# Patient Record
Sex: MALE | Race: OTHER | Employment: FULL TIME | URBAN - METROPOLITAN AREA
[De-identification: names, ages, dates, MRNs, and addresses within clinical notes are randomized per-mention and may not be internally consistent; named-entity substitution may affect disease eponyms.]

---

## 2018-07-27 ENCOUNTER — OFFICE VISIT (OUTPATIENT)
Dept: FAMILY MEDICINE CLINIC | Age: 23
End: 2018-07-27

## 2018-07-27 VITALS
SYSTOLIC BLOOD PRESSURE: 118 MMHG | DIASTOLIC BLOOD PRESSURE: 76 MMHG | RESPIRATION RATE: 14 BRPM | OXYGEN SATURATION: 98 % | WEIGHT: 165.2 LBS | BODY MASS INDEX: 25.04 KG/M2 | HEART RATE: 54 BPM | HEIGHT: 68 IN | TEMPERATURE: 97 F

## 2018-07-27 DIAGNOSIS — L23.7 POISON IVY DERMATITIS: Primary | ICD-10-CM

## 2018-07-27 PROCEDURE — 99203 OFFICE O/P NEW LOW 30 MIN: CPT | Performed by: NURSE PRACTITIONER

## 2018-07-27 RX ORDER — TRIAMCINOLONE ACETONIDE 40 MG/ML
80 INJECTION, SUSPENSION INTRA-ARTICULAR; INTRAMUSCULAR ONCE
Status: COMPLETED | OUTPATIENT
Start: 2018-07-27 | End: 2018-07-27

## 2018-07-27 RX ORDER — DIAPER,BRIEF,INFANT-TODD,DISP
EACH MISCELLANEOUS
Qty: 56 G | Refills: 1 | Status: SHIPPED | OUTPATIENT
Start: 2018-07-27 | End: 2022-01-11

## 2018-07-27 RX ADMIN — TRIAMCINOLONE ACETONIDE 80 MG: 40 INJECTION, SUSPENSION INTRA-ARTICULAR; INTRAMUSCULAR at 13:17

## 2018-07-27 ASSESSMENT — ENCOUNTER SYMPTOMS
RHINORRHEA: 0
ABDOMINAL PAIN: 0
NAIL CHANGES: 0
EYE DISCHARGE: 0
EYE ITCHING: 0
SHORTNESS OF BREATH: 0
VOMITING: 0
NAUSEA: 0
DIARRHEA: 0
SORE THROAT: 0
PHOTOPHOBIA: 0
COUGH: 0
SINUS PRESSURE: 0
EYE PAIN: 0
EYE REDNESS: 0
TROUBLE SWALLOWING: 0

## 2018-07-27 ASSESSMENT — PATIENT HEALTH QUESTIONNAIRE - PHQ9
SUM OF ALL RESPONSES TO PHQ QUESTIONS 1-9: 0
1. LITTLE INTEREST OR PLEASURE IN DOING THINGS: 0
SUM OF ALL RESPONSES TO PHQ9 QUESTIONS 1 & 2: 0
2. FEELING DOWN, DEPRESSED OR HOPELESS: 0

## 2018-07-27 NOTE — PATIENT INSTRUCTIONS
Patient Education        Poison Baldev Tod, [place], and Sumac: Care Instructions  Your Care Instructions    Poison ivy, poison oak, and poison sumac are plants that can cause a skin rash upon contact. The red, itchy rash often shows up in lines or streaks and may cause fluid-filled blisters or large, raised hives. The rash is caused by an allergic reaction to an oil in poison ivy, oak, and sumac. The rash may occur when you touch the plant or when you touch clothing, pet fur, sporting gear, gardening tools, or other objects that have come in contact with one of these plants. You cannot catch or spread the rash, even if you touch it or the blister fluid, because the plant oil will already have been absorbed or washed off the skin. The rash may seem to be spreading, but either it is still developing from earlier contact or you have touched something that still has the plant oil on it. Follow-up care is a key part of your treatment and safety. Be sure to make and go to all appointments, and call your doctor if you are having problems. It's also a good idea to know your test results and keep a list of the medicines you take. How can you care for yourself at home? · If your doctor prescribed a cream, use it as directed. If your doctor prescribed medicine, take it exactly as prescribed. Call your doctor if you think you are having a problem with your medicine. · Use cold, wet cloths to reduce itching. · Keep cool, and stay out of the sun. · Leave the rash open to the air. · Wash all clothing or other things that may have come in contact with the plant oil. · Avoid most lotions and ointments until the rash heals. Calamine lotion may help relieve symptoms of a plant rash. Use it 3 or 4 times a day. To prevent poison ivy exposure  If you know that you will be near poison ivy, oak, or sumac, you can try these options:  · Use a product designed to help prevent plant oil from getting on the skin.  These products, such as Baldev Tod

## 2021-06-03 ENCOUNTER — HOSPITAL ENCOUNTER (EMERGENCY)
Age: 26
Discharge: HOME | End: 2021-06-03
Payer: COMMERCIAL

## 2021-06-03 VITALS
HEART RATE: 55 BPM | TEMPERATURE: 98.9 F | HEIGHT: 68 IN | RESPIRATION RATE: 16 BRPM | WEIGHT: 170 LBS | BODY MASS INDEX: 25.76 KG/M2 | OXYGEN SATURATION: 96 %

## 2021-06-03 DIAGNOSIS — K08.89 PAIN, DENTAL: Primary | ICD-10-CM

## 2021-06-03 DIAGNOSIS — K04.7 DENTAL INFECTION: ICD-10-CM

## 2021-06-03 PROCEDURE — 99284 EMERGENCY DEPT VISIT MOD MDM: CPT

## 2021-06-03 PROCEDURE — 6370000000 HC RX 637 (ALT 250 FOR IP): Performed by: NURSE PRACTITIONER

## 2021-06-03 RX ORDER — TRAMADOL HYDROCHLORIDE 50 MG/1
50 TABLET ORAL ONCE
Status: COMPLETED | OUTPATIENT
Start: 2021-06-03 | End: 2021-06-03

## 2021-06-03 RX ORDER — PENICILLIN V POTASSIUM 500 MG/1
500 TABLET ORAL 4 TIMES DAILY
Qty: 28 TABLET | Refills: 0 | Status: SHIPPED | OUTPATIENT
Start: 2021-06-03 | End: 2021-06-10

## 2021-06-03 RX ORDER — IBUPROFEN 600 MG/1
600 TABLET ORAL EVERY 6 HOURS PRN
Qty: 28 TABLET | Refills: 0 | Status: SHIPPED | OUTPATIENT
Start: 2021-06-03 | End: 2022-01-11

## 2021-06-03 RX ORDER — TRAMADOL HYDROCHLORIDE 50 MG/1
50 TABLET ORAL EVERY 6 HOURS PRN
Qty: 12 TABLET | Refills: 0 | Status: SHIPPED | OUTPATIENT
Start: 2021-06-03 | End: 2021-06-06

## 2021-06-03 RX ORDER — PENICILLIN V POTASSIUM 500 MG/1
500 TABLET ORAL ONCE
Status: COMPLETED | OUTPATIENT
Start: 2021-06-03 | End: 2021-06-03

## 2021-06-03 RX ADMIN — PENICILLIN V POTASIUM 500 MG: 500 TABLET OROPHARYNGEAL at 11:50

## 2021-06-03 RX ADMIN — TRAMADOL HYDROCHLORIDE 50 MG: 50 TABLET ORAL at 11:50

## 2021-06-03 ASSESSMENT — ENCOUNTER SYMPTOMS
COUGH: 0
WHEEZING: 0
CONSTIPATION: 0
VOMITING: 0
COLOR CHANGE: 0
BACK PAIN: 0
SINUS PRESSURE: 0
NAUSEA: 0
ABDOMINAL DISTENTION: 0
RHINORRHEA: 0
CHEST TIGHTNESS: 0
SORE THROAT: 0
TROUBLE SWALLOWING: 0
ABDOMINAL PAIN: 0
FACIAL SWELLING: 1
SINUS PAIN: 0
SHORTNESS OF BREATH: 0
DIARRHEA: 0

## 2021-06-03 ASSESSMENT — PAIN DESCRIPTION - LOCATION: LOCATION: TEETH

## 2021-06-03 ASSESSMENT — PAIN DESCRIPTION - PAIN TYPE: TYPE: ACUTE PAIN

## 2021-06-03 ASSESSMENT — PAIN SCALES - GENERAL
PAINLEVEL_OUTOF10: 8
PAINLEVEL_OUTOF10: 9

## 2021-06-03 ASSESSMENT — PAIN DESCRIPTION - ORIENTATION: ORIENTATION: LEFT;LOWER

## 2021-06-03 ASSESSMENT — PAIN DESCRIPTION - DESCRIPTORS: DESCRIPTORS: ACHING

## 2021-06-03 ASSESSMENT — PAIN DESCRIPTION - FREQUENCY: FREQUENCY: CONTINUOUS

## 2021-06-03 NOTE — ED PROVIDER NOTES
[address] [facility] ED  EMERGENCY DEPARTMENT ENCOUNTER      Pt Name: Adrian Almodovar  MRN: [id number]  [place] 1995  Date of evaluation: 6/3/2021  Provider: DONAVAN Barnes CNP    CHIEF COMPLAINT       Chief Complaint   Patient presents with    Dental Pain     pt c/o left lower dental pain for the past week         HISTORY OF PRESENT ILLNESS   (Location/Symptom, Timing/Onset,Context/Setting, Quality, Duration, Modifying Factors, Severity)  Note limiting factors. Adrian Almodovar is a 26 y.o. male who presents to the emergency department for complaint of left lower jaw dental pain. Patient states that he has a tooth that had a small chip in it for months but over the past few days has been significantly more painful and swollen. Denies any new injury to the face or tooth. Pain is now an 8 out of 10 aching sharp throbbing pain made worse when he tries to chew. He states that the pain woke him last night and kept him awake for most of the night he has been taking Motrin 800 for the past day but it is becoming less effective. Denies any swelling in his throat difficulty swallowing. Denies any fever chills sweats or other illnesses or injuries. Nursing Notes were reviewed. REVIEW OF SYSTEMS    (2-9 systems for level 4, 10 or more for level 5)     Review of Systems   Constitutional: Negative for activity change, appetite change, chills, diaphoresis, fatigue and fever. HENT: Positive for dental problem and facial swelling. Negative for congestion, postnasal drip, rhinorrhea, sinus pressure, sinus pain, sore throat and trouble swallowing. Eyes: Negative for visual disturbance. Respiratory: Negative for cough, chest tightness, shortness of breath and wheezing. Cardiovascular: Negative for chest pain and palpitations. Gastrointestinal: Negative for abdominal distention, abdominal pain, constipation, diarrhea, nausea and vomiting.    Genitourinary: Negative for difficulty urinating, dysuria, flank pain, frequency and urgency. Musculoskeletal: Negative for arthralgias, back pain, myalgias, neck pain and neck stiffness. Skin: Negative for color change and rash. Neurological: Negative for dizziness, tremors, seizures, syncope, speech difficulty, weakness, light-headedness, numbness and headaches. Except as noted above the remainder of the review of systems was reviewed and negative. PAST MEDICAL HISTORY   History reviewed. No pertinent past medical history. History reviewed. No pertinent surgical history. Social History     Socioeconomic History    Marital status: Single     Spouse name: None    Number of children: None    Years of education: None    Highest education level: None   Occupational History    None   Tobacco Use    Smoking status: Never Smoker    Smokeless tobacco: Never Used   Substance and Sexual Activity    Alcohol use: Yes    Drug use: Yes     Types: Marijuana    Sexual activity: None   Other Topics Concern    None   Social History Narrative    None     Social Determinants of Health     Financial Resource Strain:     Difficulty of Paying Living Expenses:    Food Insecurity:     Worried About Running Out of Food in the Last Year:     [address] in the Last Year:    Transportation Needs:     Lack of Transportation (Medical):      Lack of Transportation (Non-Medical):    Physical Activity:     Days of Exercise per Week:     Minutes of Exercise per Session:    Stress:     Feeling of Stress :    Social Connections:     Frequency of Communication with Friends and Family:     Frequency of Social Gatherings with Friends and Family:     Attends Jewish Services:     Active Member of Clubs or Organizations:     Attends Club or Organization Meetings:     Marital Status:    Intimate Partner Violence:     Fear of Current or Ex-Partner:     Emotionally Abused:     Physically Abused:     Sexually Abused:        SCREENINGS PHYSICAL EXAM    (up to 7 for level 4, 8 or more for level 5)     ED Triage Vitals [06/03/21 1124]   BP Temp Temp Source Pulse Resp SpO2 Height Weight   -- 98.9 °F (37.2 °C) Oral 55 16 96 % 5' 8\" (1.727 m) 170 lb (77.1 kg)       Physical Exam  Constitutional:       General: He is not in acute distress. Appearance: Normal appearance. He is normal weight. He is not ill-appearing, toxic-appearing or diaphoretic. HENT:      Head: Normocephalic and atraumatic. Right Ear: External ear normal.      Left Ear: External ear normal.      Nose: Nose normal. No congestion or rhinorrhea. Mouth/Throat:      Mouth: Mucous membranes are moist.      Dentition: Abnormal dentition. Dental tenderness and gingival swelling present. No dental caries or gum lesions. Pharynx: Oropharynx is clear. Uvula midline. No pharyngeal swelling, oropharyngeal exudate, posterior oropharyngeal erythema or uvula swelling. Eyes:      General:         Right eye: No discharge. Left eye: No discharge. Conjunctiva/sclera: Conjunctivae normal.      Pupils: Pupils are equal, round, and reactive to light. Cardiovascular:      Rate and Rhythm: Normal rate and regular rhythm. Pulses: Normal pulses. Pulmonary:      Effort: Pulmonary effort is normal.      Breath sounds: Normal breath sounds. Musculoskeletal:         General: No tenderness or signs of injury. Normal range of motion. Cervical back: Normal range of motion and neck supple. No rigidity or tenderness. Skin:     General: Skin is warm and dry. Capillary Refill: Capillary refill takes less than 2 seconds. Neurological:      General: No focal deficit present. Mental Status: He is alert and oriented to person, place, and time. Mental status is at baseline. Cranial Nerves: No cranial nerve deficit. Sensory: No sensory deficit. Motor: No weakness.       Coordination: Coordination normal.         RESULTS     EKG: All EKG's are interpreted by the Emergency Department Physician who either signs or Co-signsthis chart in the absence of a cardiologist.        RADIOLOGY:   [place] such as CT, Ultrasound and MRI are read by the radiologist. Plain radiographic images are visualized and preliminarily interpreted by the emergency physician with the below findings:        Interpretation per the Radiologist below, if available at the time ofthis note:    No orders to display         ED BEDSIDE ULTRASOUND:   Performed by ED Physician - none    LABS:  Labs Reviewed - No data to display    All other labs were within normal range or not returned as of this dictation. EMERGENCY DEPARTMENT COURSE and DIFFERENTIAL DIAGNOSIS/MDM:   Vitals:    Vitals:    06/03/21 1124   Pulse: 55   Resp: 16   Temp: 98.9 °F (37.2 °C)   TempSrc: Oral   SpO2: 96%   Weight: 170 lb (77.1 kg)   Height: 5' 8\" (1.727 m)            MDM patient is afebrile nontoxic no acute distress hemodynamic stable. Patient has noticeable swelling left lower jaw and on visual examination there is a tooth that has a fractured tooth. There is no or obvious large dental decay in this area but there is mild swelling and erythema to the gumline. There does not appear to be any outer visible facial trauma. Due to concern for a dental infection patient given first dose of penicillin as well as tramadol for pain discomfort he did take a large dose of ibuprofen this morning. Patient be discharged home with addition medication including Motrin tramadol and penicillin with direction to follow-up with primary care provider and dentist soon as possible. He states that he is just now getting established with his insurance and is trying to follow-up with a dentist will make his appointment as he can. Return to the ER if any onset of new concerns and worsening condition. Patient verbalized understandable given instruction education.     CRITICAL CARE TIME CONSULTS:  None    PROCEDURES:  Unless otherwise noted below, none     Procedures    FINAL IMPRESSION      1. Pain, dental    2. Dental infection          DISPOSITION/PLAN   DISPOSITION Discharge - Pending Orders Complete 06/03/2021 11:51:34 AM      PATIENT REFERRED TO:  Madhu Sousa MD  [address]  [address]  [phone]    Call in 1 day      [facility]  [phone]  Call in 1 day        DISCHARGE MEDICATIONS:  New Prescriptions    IBUPROFEN (ADVIL;MOTRIN) 600 MG TABLET    Take 1 tablet by mouth every 6 hours as needed for Pain    PENICILLIN V POTASSIUM (VEETID) 500 MG TABLET    Take 1 tablet by mouth 4 times daily for 7 days    TRAMADOL (ULTRAM) 50 MG TABLET    Take 1 tablet by mouth every 6 hours as needed for Pain for up to 3 days. Intended supply: 3 days.  Take lowest dose possible to manage pain          (Please notethat portions of this note were completed with a voice recognition program.  Efforts were made to edit the dictations but occasionally words are mis-transcribed.)    DONAVAN Chappell CNP (electronically signed)  Attending Emergency Physician         DONAVAN Chappell CNP  06/03/21 [id number]

## 2021-06-03 NOTE — ED TRIAGE NOTES
Pt c/o lower left side dental pain for the past week, Pt is A&OX3, calm, ambulatory, afebrile, breathes are equal and unlabored,

## 2022-01-11 ENCOUNTER — OFFICE VISIT (OUTPATIENT)
Dept: FAMILY MEDICINE CLINIC | Age: 27
End: 2022-01-11

## 2022-01-11 ENCOUNTER — HOSPITAL ENCOUNTER (INPATIENT)
Age: 27
LOS: 2 days | Discharge: HOME | DRG: 153 | End: 2022-01-13
Attending: EMERGENCY MEDICINE | Admitting: INTERNAL MEDICINE
Payer: COMMERCIAL

## 2022-01-11 ENCOUNTER — APPOINTMENT (OUTPATIENT)
Dept: CT IMAGING | Age: 27
DRG: 153 | End: 2022-01-11
Payer: COMMERCIAL

## 2022-01-11 VITALS
HEART RATE: 101 BPM | TEMPERATURE: 96.8 F | BODY MASS INDEX: 26.52 KG/M2 | OXYGEN SATURATION: 99 % | WEIGHT: 175 LBS | HEIGHT: 68 IN | DIASTOLIC BLOOD PRESSURE: 70 MMHG | SYSTOLIC BLOOD PRESSURE: 122 MMHG

## 2022-01-11 DIAGNOSIS — L02.91 PHLEGMON: Primary | ICD-10-CM

## 2022-01-11 DIAGNOSIS — J03.90 ACUTE TONSILLITIS, UNSPECIFIED ETIOLOGY: ICD-10-CM

## 2022-01-11 DIAGNOSIS — J36 TONSILLAR ABSCESS: Primary | ICD-10-CM

## 2022-01-11 LAB
ALBUMIN SERPL-MCNC: 4.1 G/DL (ref 3.5–4.6)
ALP BLD-CCNC: 89 U/L (ref 35–104)
ALT SERPL-CCNC: 7 U/L (ref 0–41)
ANION GAP SERPL CALCULATED.3IONS-SCNC: 13 MEQ/L (ref 9–15)
AST SERPL-CCNC: 11 U/L (ref 0–40)
BASOPHILS ABSOLUTE: 0 K/UL (ref 0–0.2)
BASOPHILS RELATIVE PERCENT: 0.2 %
BILIRUB SERPL-MCNC: 0.5 MG/DL (ref 0.2–0.7)
BUN BLDV-MCNC: 9 MG/DL (ref 6–20)
CALCIUM SERPL-MCNC: 9.2 MG/DL (ref 8.5–9.9)
CHLORIDE BLD-SCNC: 102 MEQ/L (ref 95–107)
CO2: 24 MEQ/L (ref 20–31)
CREAT SERPL-MCNC: 0.88 MG/DL (ref 0.7–1.2)
EOSINOPHILS ABSOLUTE: 0 K/UL (ref 0–0.7)
EOSINOPHILS RELATIVE PERCENT: 0.2 %
GFR AFRICAN AMERICAN: >60
GFR NON-AFRICAN AMERICAN: >60
GLOBULIN: 3.5 G/DL (ref 2.3–3.5)
GLUCOSE BLD-MCNC: 109 MG/DL (ref 70–99)
HCT VFR BLD CALC: 42.8 % (ref 42–52)
HEMOGLOBIN: 14.2 G/DL (ref 14–18)
LACTIC ACID: 1.6 MMOL/L (ref 0.5–2.2)
LYMPHOCYTES ABSOLUTE: 1.1 K/UL (ref 1–4.8)
LYMPHOCYTES RELATIVE PERCENT: 9.6 %
MCH RBC QN AUTO: 29.3 PG (ref 27–31.3)
MCHC RBC AUTO-ENTMCNC: 33.1 % (ref 33–37)
MCV RBC AUTO: 88.4 FL (ref 80–100)
MONO TEST: NEGATIVE
MONOCYTES ABSOLUTE: 1 K/UL (ref 0.2–0.8)
MONOCYTES RELATIVE PERCENT: 8.6 %
NEUTROPHILS ABSOLUTE: 9.4 K/UL (ref 1.4–6.5)
NEUTROPHILS RELATIVE PERCENT: 81.4 %
PDW BLD-RTO: 13.3 % (ref 11.5–14.5)
PLATELET # BLD: 286 K/UL (ref 130–400)
POC CREATININE WHOLE BLOOD: 0.9
POTASSIUM SERPL-SCNC: 4.1 MEQ/L (ref 3.4–4.9)
RBC # BLD: 4.84 M/UL (ref 4.7–6.1)
SODIUM BLD-SCNC: 139 MEQ/L (ref 135–144)
STREP GRP A PCR: POSITIVE
TOTAL PROTEIN: 7.6 G/DL (ref 6.3–8)
WBC # BLD: 11.6 K/UL (ref 4.8–10.8)

## 2022-01-11 PROCEDURE — 96374 THER/PROPH/DIAG INJ IV PUSH: CPT

## 2022-01-11 PROCEDURE — 6360000002 HC RX W HCPCS

## 2022-01-11 PROCEDURE — 86308 HETEROPHILE ANTIBODY SCREEN: CPT

## 2022-01-11 PROCEDURE — 83605 ASSAY OF LACTIC ACID: CPT

## 2022-01-11 PROCEDURE — 6360000004 HC RX CONTRAST MEDICATION: Performed by: PHYSICIAN ASSISTANT

## 2022-01-11 PROCEDURE — 80053 COMPREHEN METABOLIC PANEL: CPT

## 2022-01-11 PROCEDURE — 2580000003 HC RX 258: Performed by: INTERNAL MEDICINE

## 2022-01-11 PROCEDURE — 2580000003 HC RX 258: Performed by: PHYSICIAN ASSISTANT

## 2022-01-11 PROCEDURE — 85025 COMPLETE CBC W/AUTO DIFF WBC: CPT

## 2022-01-11 PROCEDURE — 36415 COLL VENOUS BLD VENIPUNCTURE: CPT

## 2022-01-11 PROCEDURE — 96375 TX/PRO/DX INJ NEW DRUG ADDON: CPT

## 2022-01-11 PROCEDURE — 6360000002 HC RX W HCPCS: Performed by: PHYSICIAN ASSISTANT

## 2022-01-11 PROCEDURE — 87040 BLOOD CULTURE FOR BACTERIA: CPT

## 2022-01-11 PROCEDURE — 99285 EMERGENCY DEPT VISIT HI MDM: CPT

## 2022-01-11 PROCEDURE — 1210000000 HC MED SURG R&B

## 2022-01-11 PROCEDURE — 70491 CT SOFT TISSUE NECK W/DYE: CPT

## 2022-01-11 PROCEDURE — 93005 ELECTROCARDIOGRAM TRACING: CPT

## 2022-01-11 PROCEDURE — 87651 STREP A DNA AMP PROBE: CPT

## 2022-01-11 PROCEDURE — 99999 PR OFFICE/OUTPT VISIT,PROCEDURE ONLY: CPT | Performed by: NURSE PRACTITIONER

## 2022-01-11 RX ORDER — ONDANSETRON 4 MG/1
4 TABLET, ORALLY DISINTEGRATING ORAL EVERY 8 HOURS PRN
Status: DISCONTINUED | OUTPATIENT
Start: 2022-01-11 | End: 2022-01-13 | Stop reason: HOSPADM

## 2022-01-11 RX ORDER — MORPHINE SULFATE 2 MG/ML
2 INJECTION, SOLUTION INTRAMUSCULAR; INTRAVENOUS
Status: COMPLETED | OUTPATIENT
Start: 2022-01-11 | End: 2022-01-11

## 2022-01-11 RX ORDER — KETOROLAC TROMETHAMINE 30 MG/ML
INJECTION, SOLUTION INTRAMUSCULAR; INTRAVENOUS
Status: COMPLETED
Start: 2022-01-11 | End: 2022-01-11

## 2022-01-11 RX ORDER — KETOROLAC TROMETHAMINE 30 MG/ML
30 INJECTION, SOLUTION INTRAMUSCULAR; INTRAVENOUS ONCE
Status: COMPLETED | OUTPATIENT
Start: 2022-01-11 | End: 2022-01-11

## 2022-01-11 RX ORDER — SODIUM CHLORIDE 0.9 % (FLUSH) 0.9 %
5-40 SYRINGE (ML) INJECTION EVERY 12 HOURS SCHEDULED
Status: DISCONTINUED | OUTPATIENT
Start: 2022-01-11 | End: 2022-01-13 | Stop reason: HOSPADM

## 2022-01-11 RX ORDER — DEXAMETHASONE SODIUM PHOSPHATE 4 MG/ML
10 INJECTION, SOLUTION INTRA-ARTICULAR; INTRALESIONAL; INTRAMUSCULAR; INTRAVENOUS; SOFT TISSUE ONCE
Status: COMPLETED | OUTPATIENT
Start: 2022-01-11 | End: 2022-01-11

## 2022-01-11 RX ORDER — ONDANSETRON 2 MG/ML
4 INJECTION INTRAMUSCULAR; INTRAVENOUS EVERY 6 HOURS PRN
Status: DISCONTINUED | OUTPATIENT
Start: 2022-01-11 | End: 2022-01-13 | Stop reason: HOSPADM

## 2022-01-11 RX ORDER — SODIUM CHLORIDE 0.9 % (FLUSH) 0.9 %
5-40 SYRINGE (ML) INJECTION PRN
Status: DISCONTINUED | OUTPATIENT
Start: 2022-01-11 | End: 2022-01-13 | Stop reason: HOSPADM

## 2022-01-11 RX ORDER — SODIUM CHLORIDE 9 MG/ML
25 INJECTION, SOLUTION INTRAVENOUS PRN
Status: DISCONTINUED | OUTPATIENT
Start: 2022-01-11 | End: 2022-01-13 | Stop reason: HOSPADM

## 2022-01-11 RX ORDER — METHYLPREDNISOLONE SODIUM SUCCINATE 125 MG/2ML
40 INJECTION, POWDER, LYOPHILIZED, FOR SOLUTION INTRAMUSCULAR; INTRAVENOUS DAILY
Status: DISCONTINUED | OUTPATIENT
Start: 2022-01-12 | End: 2022-01-12

## 2022-01-11 RX ADMIN — KETOROLAC TROMETHAMINE 30 MG: 30 INJECTION, SOLUTION INTRAMUSCULAR; INTRAVENOUS at 16:12

## 2022-01-11 RX ADMIN — IOPAMIDOL 100 ML: 612 INJECTION, SOLUTION INTRAVENOUS at 14:30

## 2022-01-11 RX ADMIN — DEXAMETHASONE SODIUM PHOSPHATE 10 MG: 4 INJECTION, SOLUTION INTRA-ARTICULAR; INTRALESIONAL; INTRAMUSCULAR; INTRAVENOUS; SOFT TISSUE at 13:13

## 2022-01-11 RX ADMIN — KETOROLAC TROMETHAMINE 30 MG: 30 INJECTION, SOLUTION INTRAMUSCULAR at 16:12

## 2022-01-11 RX ADMIN — MORPHINE SULFATE 2 MG: 2 INJECTION, SOLUTION INTRAMUSCULAR; INTRAVENOUS at 13:13

## 2022-01-11 RX ADMIN — PIPERACILLIN AND TAZOBACTAM 3375 MG: 3; .375 INJECTION, POWDER, LYOPHILIZED, FOR SOLUTION INTRAVENOUS at 17:05

## 2022-01-11 RX ADMIN — Medication 10 ML: at 22:32

## 2022-01-11 SDOH — ECONOMIC STABILITY: FOOD INSECURITY: WITHIN THE PAST 12 MONTHS, YOU WORRIED THAT YOUR FOOD WOULD RUN OUT BEFORE YOU GOT MONEY TO BUY MORE.: NEVER TRUE

## 2022-01-11 SDOH — ECONOMIC STABILITY: FOOD INSECURITY: WITHIN THE PAST 12 MONTHS, THE FOOD YOU BOUGHT JUST DIDN'T LAST AND YOU DIDN'T HAVE MONEY TO GET MORE.: NEVER TRUE

## 2022-01-11 ASSESSMENT — ENCOUNTER SYMPTOMS
SORE THROAT: 1
SHORTNESS OF BREATH: 0
BACK PAIN: 0
PHOTOPHOBIA: 0
ABDOMINAL PAIN: 0
VOMITING: 0
DIARRHEA: 0
RHINORRHEA: 0
COUGH: 0
NAUSEA: 0
EYE PAIN: 0

## 2022-01-11 ASSESSMENT — PATIENT HEALTH QUESTIONNAIRE - PHQ9
SUM OF ALL RESPONSES TO PHQ9 QUESTIONS 1 & 2: 0
SUM OF ALL RESPONSES TO PHQ QUESTIONS 1-9: 0
1. LITTLE INTEREST OR PLEASURE IN DOING THINGS: 0
SUM OF ALL RESPONSES TO PHQ QUESTIONS 1-9: 0
2. FEELING DOWN, DEPRESSED OR HOPELESS: 0

## 2022-01-11 ASSESSMENT — PAIN SCALES - GENERAL
PAINLEVEL_OUTOF10: 6
PAINLEVEL_OUTOF10: 9
PAINLEVEL_OUTOF10: 9

## 2022-01-11 ASSESSMENT — PAIN DESCRIPTION - PAIN TYPE: TYPE: ACUTE PAIN

## 2022-01-11 ASSESSMENT — SOCIAL DETERMINANTS OF HEALTH (SDOH): HOW HARD IS IT FOR YOU TO PAY FOR THE VERY BASICS LIKE FOOD, HOUSING, MEDICAL CARE, AND HEATING?: SOMEWHAT HARD

## 2022-01-11 ASSESSMENT — PAIN DESCRIPTION - DESCRIPTORS: DESCRIPTORS: SORE

## 2022-01-11 ASSESSMENT — PAIN DESCRIPTION - LOCATION: LOCATION: THROAT

## 2022-01-11 NOTE — ED PROVIDER NOTES
[address] [facility] ED  eMERGENCY dEPARTMENTeNCOUnter      Pt Name: Nicol Paris  MRN: [id number]  [place] 1995  Date ofevaluation: 1/11/2022  Provider: Valerie Lima PA-C    CHIEF COMPLAINT       Chief Complaint   Patient presents with    Abscess     Sent by [address] in [facility] for abscess on tonsil. Pain started last week         HISTORY OF PRESENT ILLNESS   (Location/Symptom, Timing/Onset,Context/Setting, Quality, Duration, Modifying Factors, Severity)  Note limiting factors. Nicol Paris is a 23 y.o. male who presents to the emergency department sided throat swelling x1 week. Patient has tried over-the-counter's without relief and is getting worse he is having difficulty swallowing due to the pain. He is feeling swelling to his neck as well. Denies any known fevers but he has been taking Tylenol and Motrin. He was seen at urgent care and sent to the emergency department due to concern for tonsillar abscess. He is swallowing his secretions there has been no drooling and he is able to take small sips of water. He is not having any breathing problems. HPI    NursingNotes were reviewed. REVIEW OF SYSTEMS    (2-9 systems for level 4, 10 or more for level 5)     Review of Systems   Constitutional: Negative for chills, diaphoresis, fatigue and fever. HENT: Positive for sore throat. Negative for congestion and rhinorrhea. Eyes: Negative for photophobia and pain. Respiratory: Negative for cough and shortness of breath. Cardiovascular: Negative for chest pain and palpitations. Gastrointestinal: Negative for abdominal pain, diarrhea, nausea and vomiting. Genitourinary: Negative for dysuria and flank pain. Musculoskeletal: Negative for back pain. Skin: Negative for rash. Neurological: Negative for dizziness, light-headedness and headaches. Psychiatric/Behavioral: Negative. All other systems reviewed and are negative.       Except as noted above the remainder of the review of systems was reviewed and negative. PAST MEDICAL HISTORY   History reviewed. No pertinent past medical history. SURGICALHISTORY     History reviewed. No pertinent surgical history. CURRENT MEDICATIONS       Previous Medications    No medications on file       ALLERGIES     Patient has no known allergies. FAMILY HISTORY     History reviewed. No pertinent family history. SOCIAL HISTORY       Social History     Socioeconomic History    Marital status: Single     Spouse name: None    Number of children: None    Years of education: None    Highest education level: None   Occupational History    None   Tobacco Use    Smoking status: Never Smoker    Smokeless tobacco: Never Used   Vaping Use    Vaping Use: Never used   Substance and Sexual Activity    Alcohol use: Yes     Comment: Socially    Drug use: Yes     Types: Marijuana [place] Becky)    Sexual activity: None   Other Topics Concern    None   Social History Narrative    None     Social Determinants of Health     Financial Resource Strain: Medium Risk    Difficulty of Paying Living Expenses: Somewhat hard   Food Insecurity: No Food Insecurity    Worried About Running Out of Food in the Last Year: Never true    John of Food in the Last Year: Never true   Transportation Needs:     Lack of Transportation (Medical): Not on file    Lack of Transportation (Non-Medical):  Not on file   Physical Activity:     Days of Exercise per Week: Not on file    Minutes of Exercise per Session: Not on file   Stress:     Feeling of Stress : Not on file   Social Connections:     Frequency of Communication with Friends and Family: Not on file    Frequency of Social Gatherings with Friends and Family: Not on file    Attends Sikhism Services: Not on file    Active Member of Clubs or Organizations: Not on file    Attends Club or Organization Meetings: Not on file    Marital Status: Not on file   Intimate Partner Violence:     Fear of Current or Ex-Partner: Not on file    Emotionally Abused: Not on file    Physically Abused: Not on file    Sexually Abused: Not on file   Housing Stability:     Unable to Pay for Housing in the Last Year: Not on file    Number of [place] in the Last Year: Not on file    Unstable Housing in the Last Year: Not on file       SCREENINGS      @FLOW([id number])@      PHYSICAL EXAM    (up to 7 for level 4, 8 or more for level 5)     ED Triage Vitals [01/11/22 1206]   BP Temp Temp Source Pulse Resp SpO2 Height Weight   (!) 140/93 99.4 °F (37.4 °C) Oral 94 16 97 % 5' 8\" (1.727 m) 175 lb (79.4 kg)       Physical Exam  Vitals and nursing note reviewed. Constitutional:       General: He is not in acute distress. Appearance: Normal appearance. He is well-developed. He is not diaphoretic. HENT:      Head: Normocephalic and atraumatic. Mouth/Throat:      Lips: Pink. Mouth: Mucous membranes are moist.      Pharynx: Pharyngeal swelling and posterior oropharyngeal erythema present. Tonsils: Tonsillar abscess (left) present. Comments: Asymmetric swelling left>right extends into palate. Uvula deviated. No drooling. Swallowing secretions. Eyes:      General: Lids are normal.      Conjunctiva/sclera: Conjunctivae normal.   Cardiovascular:      Rate and Rhythm: Normal rate and regular rhythm. Pulses: Normal pulses. Heart sounds: Normal heart sounds. Pulmonary:      Effort: Pulmonary effort is normal.      Breath sounds: Normal breath sounds. Abdominal:      General: Bowel sounds are normal.      Palpations: Abdomen is soft. Tenderness: There is no abdominal tenderness. Musculoskeletal:      Cervical back: Normal range of motion and neck supple. Lymphadenopathy:      Cervical: No cervical adenopathy. Skin:     General: Skin is warm and dry. Capillary Refill: Capillary refill takes less than 2 seconds. Findings: No rash.    Neurological:      Mental Status: He is alert and oriented to person, place, and time. Psychiatric:         Thought Content: Thought content normal.         Judgment: Judgment normal.         DIAGNOSTIC RESULTS     EKG: All EKG's are interpreted by the Emergency Department Physician who either signs or Co-signsthis chart in the absence of a cardiologist.        RADIOLOGY:   [place] such as CT, Ultrasound and MRI are read by the radiologist. Plain radiographic images are visualized and preliminarily interpreted by the emergency physician with the below findings:        Interpretation per the Radiologist below, if available at the time ofthis note:    CT SOFT TISSUE NECK W CONTRAST   Final Result      Bilateral tonsillitis with phlegmonous change. Bilateral diffuse lymph notes as described, with several showing borderline lymph node enlargement. Pansinusitis. All CT scans at this facility use dose modulation, iterative reconstruction, and/or weight based dosing when appropriate to reduce radiation dose to as low as reasonably achievable. ED BEDSIDE ULTRASOUND:   Performed by ED Physician - none    LABS:  Labs Reviewed   COMPREHENSIVE METABOLIC PANEL - Abnormal; Notable for the following components:       Result Value    Glucose 109 (*)     All other components within normal limits   CBC WITH AUTO DIFFERENTIAL - Abnormal; Notable for the following components:    WBC 11.6 (*)     Neutrophils Absolute 9.4 (*)     Monocytes Absolute 1.0 (*)     All other components within normal limits   POCT CREATININE - URINE - Normal   CULTURE, BLOOD 1   CULTURE, BLOOD 2   RAPID STREP SCREEN   LACTIC ACID, PLASMA   MONONUCLEOSIS SCREEN       All other labs were within normal range or not returned as of this dictation.     EMERGENCY DEPARTMENT COURSE and DIFFERENTIAL DIAGNOSIS/MDM:   Vitals:    Vitals:    01/11/22 1206 01/11/22 1600   BP: (!) 140/93 130/70   Pulse: 94 96   Resp: 16 16   Temp: 99.4 °F (37.4 °C)    TempSrc: Oral SpO2: 97% 97%   Weight: 175 lb (79.4 kg)    Height: 5' 8\" (1.727 m)            MDM  Patient is a 64-year-old who presents for worsening's swelling in his throat is worse on the left side. There is concern for tonsillar abscess. He is afebrile upon arrival but has been taking Tylenol and Motrin. He swallowing his own secretions but does have the high potato voice change. He does have an obvious tonsillitis on exam left worse than right with lymphadenopathy. This patient has leukocytosis of 11.6. Patient also had CAT scan that shows phlegmonous changes with a tonsillitis. Spoke to ENT on-call Dr. Anderson Santiago will start Zosyn and admit for further treatment and evaluation. Patient stable for admission. Dr. Leverette Homans accepted pt. REASSESSMENT          CRITICAL CARE TIME   Total Critical Care time was  minutes, excluding separatelyreportable procedures. There was a high probability ofclinically significant/life threatening deterioration in the patient's condition which required my urgent intervention. CONSULTS:  None    PROCEDURES:  Unless otherwise noted below, none     Procedures    FINAL IMPRESSION      1. Phlegmon    2. Acute tonsillitis, unspecified etiology          DISPOSITION/PLAN   DISPOSITION Admitted 01/11/2022 05:11:55 PM      PATIENT REFERREDTO:  No follow-up provider specified.     DISCHARGEMEDICATIONS:  New Prescriptions    No medications on file          (Please note that portions of this note were completed with a voice recognition program.  Efforts were made to edit the dictations but occasionally words are mis-transcribed.)    Hunter Milan PA-C (electronically signed)  Attending Emergency Physician         Hunter Milan PA-C  01/11/22 [id number]

## 2022-01-11 NOTE — PROGRESS NOTES
Pharmacy Dose Adjustment Per Protocol    Faye Downey is a 23 y.o. male. Recent Labs     01/11/22  1230   BUN 9   CREATININE 0.88   Estimated Creatinine Clearance: 124 mL/min (based on SCr of 0.88 mg/dL). [place] Height: 5' 8\" (172.7 cm), Weight: 175 lb (79.4 kg), Body mass index is 26.61 kg/m².     Piperacillin/Tazobactam [Zosyn]      Medication Ordered:   Traditional Dosing Change to Extended Infusion:   CrCl ?20 ml/min [] Zosyn 2.25 gm q6-8 hr [x] Zosyn 3.375 gm IV q8h, infuse over 4 hr    [x] Zosyn 3.375 gm q6-8 hr     [] Zosyn 4.5 gm q6-8 hr    CrCl <20 ml/min or ESRD [] Zosyn 2.25 gm q6-8 hr [] Zosyn 3.375 gm IV q12h, infuse over 4 hr    [] Zosyn 3.375 gm q6-8 hr     [] Zosyn 4.5 gm q6-8 hr      Thank you,

## 2022-01-11 NOTE — H&P
[place] [address] MEDICINE    HISTORY AND PHYSICAL EXAM    PATIENT NAME:  Celina Gomez    MRN:  [id number]  SERVICE DATE:  1/11/2022   SERVICE TIME:  5:16 PM    Primary Care Physician: Zayra Nunez MD     SUBJECTIVE  CHIEF COMPLAINT: Throat swelling    HPI:  Celina Gomez is a 23 y.o., , male who presents with complaint of throat pain and swelling. PMH significant for  has no past medical history on file. .      Patient is a 80-year-old male presenting with 1 week of L>R throat swelling with associated pain. He attempted to manage with outpatient measures, but symptoms progressed. He has not had any difficulty breathing or uncontrollable drooling as of yet. He is a point where he can only take small sips of water at present due to pain provocation and swelling. He was seen and walk-in clinic 1/11/2022, and sent directly to ED for higher level evaluation. In the ED, he was found to have mild temperature elevation 99.4 °F, leukocytosis to 11.6, and CT soft tissue neck demonstrating bilateral L >R tonsil enlargement with associated lymphadenopathy and pansinusitis. ED service consulted ENT (Dr. Kasey Alvarado), who recommended admission and antibiotics, with ENT to follow. Hospitalist service is consulted for admission. On exam, patient repeats the above. He denies any dyspnea at present. He has significant odynophagia. He had intermittent mild chills. He has no recent oral injuries or surgeries of any kind. He has not had similar issues in the past.  He does still have both tonsils. He has no other new/acute complaints at present. Patient is full code. PAST MEDICAL HISTORY:  History reviewed. No pertinent past medical history. PAST SURGICAL HISTORY:  History reviewed. No pertinent surgical history. FAMILY HISTORY:  History reviewed. No pertinent family history.   SOCIAL HISTORY:    Social History     Socioeconomic History    Marital status: Single     Spouse name: Not on file  Number of children: Not on file    Years of education: Not on file    Highest education level: Not on file   Occupational History    Not on file   Tobacco Use    Smoking status: Never Smoker    Smokeless tobacco: Never Used   Vaping Use    Vaping Use: Never used   Substance and Sexual Activity    Alcohol use: Yes     Comment: Socially    Drug use: Yes     Types: Marijuana [place])    Sexual activity: Not on file   Other Topics Concern    Not on file   Social History Narrative    Not on file     Social Determinants of Health     Financial Resource Strain: Medium Risk    Difficulty of Paying Living Expenses: Somewhat hard   Food Insecurity: No Food Insecurity    Worried About Running Out of Food in the Last Year: Never true    John of Food in the Last Year: Never true   Transportation Needs:     Lack of Transportation (Medical): Not on file    Lack of Transportation (Non-Medical): Not on file   Physical Activity:     Days of Exercise per Week: Not on file    Minutes of Exercise per Session: Not on file   Stress:     Feeling of Stress : Not on file   Social Connections:     Frequency of Communication with Friends and Family: Not on file    Frequency of Social Gatherings with Friends and Family: Not on file    Attends [employer] Services: Not on file    Active Member of [address] [place] [address] Dydra or Organizations: Not on file    Attends Club or Organization Meetings: Not on file    Marital Status: Not on file   Intimate Partner Violence:     Fear of Current or Ex-Partner: Not on file    Emotionally Abused: Not on file    Physically Abused: Not on file    Sexually Abused: Not on file   Housing Stability:     Unable to Pay for Housing in the Last Year: Not on file    Number of [place] in the Last Year: Not on file    Unstable Housing in the Last Year: Not on file     MEDICATIONS:   Prior to Admission medications    Not on File       ALLERGIES: Patient has no known allergies.     REVIEW OF SYSTEM:   A full 12 point review of systems was completed, and was unremarkable except as specified above. OBJECTIVE    /70   Pulse 96   Temp 99.4 °F (37.4 °C) (Oral)   Resp 16   Ht 5' 8\" (1.727 m)   Wt 175 lb (79.4 kg)   SpO2 97%   BMI 26.61 kg/m²     PHYSICAL EXAM:   Constitutional: Generally healthy appearing young adult male reclining in ED bed in no acute distress. Endorses throat pain and thirst.  Head: NCAT  Eyes: PERRLA, EOMI  ENT: Hearing grossly intact. L >R bilateral significant tonsil swelling with associated posterior oropharyngeal erythema. No obvious bleeding or purulent exudate appreciated. No drooling present. Neck: Left-sided soft tissue swelling near superior submandibular region. Lymphadenopathy present. Phonation essentially normal.  Cardiovascular: RRR, no significant murmurs appreciated. Warm and well perfused peripherally. No pretibial edema. Abdomen: Flat, soft, nontense abdomen. Hypoactive bowel sounds appreciated. Nontender to palpation. Neurologic: Grossly oriented. No focal neurologic deficits appreciated. Pulmonary: Normal rate and effort of respiration on room air. No evidence of impending respiratory compromise. Clear to auscultation bilaterally. No stridor, wheezing, or tripod stance present. Psychiatric: Pleasant, calm, cooperative. DATA:     Diagnostic tests reviewed for today's visit:    Most recent labs and imaging results reviewed.      LABS:    Recent Results (from the past 24 hour(s))   Comprehensive Metabolic Panel    Collection Time: 01/11/22 12:30 PM   Result Value Ref Range    Sodium 139 135 - 144 mEq/L    Potassium 4.1 3.4 - 4.9 mEq/L    Chloride 102 95 - 107 mEq/L    CO2 24 20 - 31 mEq/L    Anion Gap 13 9 - 15 mEq/L    Glucose 109 (H) 70 - 99 mg/dL    BUN 9 6 - 20 mg/dL    CREATININE 0.88 0.70 - 1.20 mg/dL    GFR Non-African American >60.0 >60    GFR  >60.0 >60    Calcium 9.2 8.5 - 9.9 mg/dL    Total Protein 7.6 6.3 - 8.0 g/dL    Albumin 4.1 3.5 - 4.6 g/dL    Total Bilirubin 0.5 0.2 - 0.7 mg/dL    Alkaline Phosphatase 89 35 - 104 U/L    ALT 7 0 - 41 U/L    AST 11 0 - 40 U/L    Globulin 3.5 2.3 - 3.5 g/dL   CBC Auto Differential    Collection Time: 01/11/22 12:30 PM   Result Value Ref Range    WBC 11.6 (H) 4.8 - 10.8 K/uL    RBC 4.84 4.70 - 6.10 M/uL    Hemoglobin 14.2 14.0 - 18.0 g/dL    Hematocrit 42.8 42.0 - 52.0 %    MCV 88.4 80.0 - 100.0 fL    MCH 29.3 27.0 - 31.3 pg    MCHC 33.1 33.0 - 37.0 %    RDW 13.3 11.5 - 14.5 %    Platelets 843 986 - 678 K/uL    Neutrophils % 81.4 %    Lymphocytes % 9.6 %    Monocytes % 8.6 %    Eosinophils % 0.2 %    Basophils % 0.2 %    Neutrophils Absolute 9.4 (H) 1.4 - 6.5 K/uL    Lymphocytes Absolute 1.1 1.0 - 4.8 K/uL    Monocytes Absolute 1.0 (H) 0.2 - 0.8 K/uL    Eosinophils Absolute 0.0 0.0 - 0.7 K/uL    Basophils Absolute 0.0 0.0 - 0.2 K/uL   Lactic Acid, Plasma    Collection Time: 01/11/22 12:30 PM   Result Value Ref Range    Lactic Acid 1.6 0.5 - 2.2 mmol/L   POCT Creatinine    Collection Time: 01/11/22  1:27 PM   Result Value Ref Range    POC CREATININE WHOLE BLOOD 0.9        IMAGING:  CT SOFT TISSUE NECK W CONTRAST    Result Date: 1/11/2022  CT SOFT TISSUE NECK WITH INTRAVENOUS CONTRAST MEDIUM. HISTORY: LEFT NECK SWELLING. TROUBLE SWALLOWING. TECHNICAL FACTORS: CT soft tissue neck obtained and formatted as 2.5 mm contiguous axial images. Sagittal and coronal reconstruction obtained during postprocessing. INTRAVENOUS CONTRAST MEDIUM:  Isovue-300, 100 mL COMPARISON: None FINDINGS: Skull base:Normal Orbits: . Without anomaly. Facial sinuses:  Partial opacification bilateral ethmoid sinuses. Opacification with calcification, right sphenoid sinus. Mucosal thickening with rounded soft tissue attenuation left maxillary sinus. Nasopharynx: Normal Mastoids:  Air cells well aerated. Oral cavity: Without anomaly.  Oropharynx: Bilateral tonsillar enlargement with ill-defined areas decreased attenuation identified bilaterally. Bilateral parapharyngeal spaces and retropharyngeal spaces: Preserved. Salivary glands: Normal Hypopharynx, larynx, supraglottis, infraglottis: : Without anomaly. Lymph nodes: Multiple bilateral lymph nodes identified in anterior posterior cervical triangle, jugulodigastric spaces, and submandibular spaces. Lymph nodes measure up to 1 cm in size. Vascular: Vascular structures enhance uniformly. Thyroid: Normal. Lung apices: Without anomaly. Bilateral tonsillitis with phlegmonous change. Bilateral diffuse lymph notes as described, with several showing borderline lymph node enlargement. Pansinusitis. All CT scans at this facility use dose modulation, iterative reconstruction, and/or weight based dosing when appropriate to reduce radiation dose to as low as reasonably achievable.        ASSESSMENT AND PLAN    Active Problems:  Severe tonsilitis (L>R) with associated LAD and concern for airway protection    Plan:  -Admit inpatient status  -ENT contacted by ED, will follow  -Continue with Zosyn initiated in ED  -Solu-Medrol 40 mg IV daily  -Follow-up blood cultures obtained in ED  -SLP consult  -NPO  -Lovenox for DVT prophylaxis        SIGNATURE: Angela Chopra DO  DATE: January 11, 2022  TIME: 5:16 PM

## 2022-01-11 NOTE — PROGRESS NOTES
Patient has enlargement of the left tonsil suggestive of tonsil abscess. Swelling is noted visually of the left lower jaw and neck. Patient able to speak , reports pain with speaking, intense pain with attempting to swallow, unable to eat x2 days. Pain present x1 week with pain worsening over past 3 days. Non-tender swelling of the neck started 2 days ago, much worse to day. Unable to open mouth wide enough to examine, used tongue depressor to visualize post pharynx, +4swelling left tonsil with swelling of the left upper soft palate erythema and notable swelling and shift of the uvula. Patient has potential for compromise of the airway, requires higher level of care at the ER for labs and CT of the neck. Offered EMS, patient declined stating they feel safe and comfortable going from the office to the ER due to short distance of travel. Airway patent, lungs clear, A&Ox4, slight elevation of heart rate on exam. ER given telephone report about patient presentation and suspected left tonsil abscess with enlargement and shift.

## 2022-01-11 NOTE — ED TRIAGE NOTES
A & Ox4. Skin pink warm and dry. Pt sent by KESHIA Owens in our walk in clinic next door for abscess on his left tonsil. Pt able to swallow but painfully. Unable to open mouth wide enough for me to see tonsils. Pt has slight hot potato voice noted. No drooling noted.

## 2022-01-12 LAB
ANION GAP SERPL CALCULATED.3IONS-SCNC: 16 MEQ/L (ref 9–15)
BASOPHILS ABSOLUTE: 0 K/UL (ref 0–0.2)
BASOPHILS RELATIVE PERCENT: 0.1 %
BUN BLDV-MCNC: 15 MG/DL (ref 6–20)
CALCIUM SERPL-MCNC: 9.8 MG/DL (ref 8.5–9.9)
CHLORIDE BLD-SCNC: 102 MEQ/L (ref 95–107)
CO2: 21 MEQ/L (ref 20–31)
CREAT SERPL-MCNC: 0.9 MG/DL (ref 0.7–1.2)
EOSINOPHILS ABSOLUTE: 0 K/UL (ref 0–0.7)
EOSINOPHILS RELATIVE PERCENT: 0 %
GFR AFRICAN AMERICAN: >60
GFR AFRICAN AMERICAN: >60
GFR NON-AFRICAN AMERICAN: >60
GFR NON-AFRICAN AMERICAN: >60
GLUCOSE BLD-MCNC: 137 MG/DL (ref 70–99)
HCT VFR BLD CALC: 46.1 % (ref 42–52)
HEMOGLOBIN: 15.2 G/DL (ref 14–18)
LYMPHOCYTES ABSOLUTE: 0.7 K/UL (ref 1–4.8)
LYMPHOCYTES RELATIVE PERCENT: 6.7 %
MCH RBC QN AUTO: 29 PG (ref 27–31.3)
MCHC RBC AUTO-ENTMCNC: 33 % (ref 33–37)
MCV RBC AUTO: 87.8 FL (ref 80–100)
MONOCYTES ABSOLUTE: 0.2 K/UL (ref 0.2–0.8)
MONOCYTES RELATIVE PERCENT: 2.1 %
NEUTROPHILS ABSOLUTE: 9.3 K/UL (ref 1.4–6.5)
NEUTROPHILS RELATIVE PERCENT: 91.1 %
PDW BLD-RTO: 13.4 % (ref 11.5–14.5)
PERFORMED ON: NORMAL
PLATELET # BLD: 346 K/UL (ref 130–400)
POC CREATININE: 0.9 MG/DL (ref 0.9–1.3)
POC SAMPLE TYPE: NORMAL
POTASSIUM REFLEX MAGNESIUM: 4.7 MEQ/L (ref 3.4–4.9)
RBC # BLD: 5.25 M/UL (ref 4.7–6.1)
SODIUM BLD-SCNC: 139 MEQ/L (ref 135–144)
WBC # BLD: 10.3 K/UL (ref 4.8–10.8)

## 2022-01-12 PROCEDURE — 85025 COMPLETE CBC W/AUTO DIFF WBC: CPT

## 2022-01-12 PROCEDURE — 6360000002 HC RX W HCPCS: Performed by: INTERNAL MEDICINE

## 2022-01-12 PROCEDURE — 80048 BASIC METABOLIC PNL TOTAL CA: CPT

## 2022-01-12 PROCEDURE — 2580000003 HC RX 258: Performed by: INTERNAL MEDICINE

## 2022-01-12 PROCEDURE — 1210000000 HC MED SURG R&B

## 2022-01-12 PROCEDURE — 92610 EVALUATE SWALLOWING FUNCTION: CPT

## 2022-01-12 PROCEDURE — 36415 COLL VENOUS BLD VENIPUNCTURE: CPT

## 2022-01-12 RX ORDER — METHYLPREDNISOLONE SODIUM SUCCINATE 40 MG/ML
20 INJECTION, POWDER, LYOPHILIZED, FOR SOLUTION INTRAMUSCULAR; INTRAVENOUS DAILY
Status: DISCONTINUED | OUTPATIENT
Start: 2022-01-13 | End: 2022-01-13 | Stop reason: HOSPADM

## 2022-01-12 RX ADMIN — Medication 10 ML: at 20:26

## 2022-01-12 RX ADMIN — CEFTRIAXONE SODIUM 1000 MG: 1 INJECTION, POWDER, FOR SOLUTION INTRAMUSCULAR; INTRAVENOUS at 14:51

## 2022-01-12 RX ADMIN — PIPERACILLIN SODIUM AND TAZOBACTAM SODIUM 3375 MG: 3; .375 INJECTION, POWDER, LYOPHILIZED, FOR SOLUTION INTRAVENOUS at 09:12

## 2022-01-12 RX ADMIN — PIPERACILLIN SODIUM AND TAZOBACTAM SODIUM 3375 MG: 3; .375 INJECTION, POWDER, LYOPHILIZED, FOR SOLUTION INTRAVENOUS at 00:41

## 2022-01-12 RX ADMIN — METHYLPREDNISOLONE SODIUM SUCCINATE 40 MG: 125 INJECTION, POWDER, FOR SOLUTION INTRAMUSCULAR; INTRAVENOUS at 09:10

## 2022-01-12 RX ADMIN — Medication 10 ML: at 09:13

## 2022-01-12 NOTE — ED NOTES
Report called to [id number], report given to [facility]. Pt to be transported when room upstairs is ready.      Alexa Kanner, RN  01/11/22 0220

## 2022-01-12 NOTE — PROGRESS NOTES
[facility]   Facility/Department: [facility] [address]  Speech Language Pathology  Clinical Bedside Swallow Evaluation    Candice Carolina  : 1995 (26 y.o.)   MRN: [id number]  [facility]  ADMISSION DATE: 2022  PATIENT DIAGNOSIS(ES): Phlegmon [L02.91]  Tonsillar abscess [J36]  Acute tonsillitis, unspecified etiology [J03.90]  Chief Complaint   Patient presents with    Abscess     Sent by [facility] in [facility] for abscess on tonsil. Pain started last week     Patient Active Problem List    Diagnosis Date Noted    Tonsillar abscess 2022     History reviewed. No pertinent past medical history. History reviewed. No pertinent surgical history. No Known Allergies    DATE ONSET: 1/10/22    Date of Evaluation: 2022   Evaluating Therapist: Leopold Corwin, SLP    Recommended Diet and Intervention  Diet Solids Recommendation:  (Pt is currently NPO awaiting ENT consult. PO diet recommended following clearance of ENT.)  Liquid Consistency Recommendation:  (Pt is currently NPO awaiting ENT consult. PO diet recommended following clearance of ENT.)  Recommended Form of Meds: PO     Compensatory Swallowing Strategies  Compensatory Swallowing Strategies: Eat/Feed slowly; Small bites/sips; Alternate solids and liquids (as symptoms persist)    Reason for Referral  Meet Dowling was referred for a bedside swallow evaluation to assess the efficiency of his swallow function, identify signs and symptoms of aspiration, identify risk factors, and make recommendations regarding safe dietary consistencies, effective compensatory strategies, and safe eating environment. General  Chart Reviewed: Yes  Comments: Severe tonsilitis (L>R) with associated LAD and concern for airway protection. Pt reports that the pain has decreased after given steroids and medication. Pt reports improve swallowing.   Subjective  Subjective: SLP observed inflammtion of tonsils and soft palate in oral mechanism exam but oropharynex opening observed with no blockage. Behavior/Cognition: Alert; Cooperative;Pleasant mood  Respiratory Status: Room air  Breath Sounds: Clear  O2 Device: None (Room air)  Communication Observation: Functional  Follows Directions: Simple  Dentition: Adequate  Baseline Vocal Quality: Normal  Volitional Cough: Strong  Prior Dysphagia History: None  Consistencies Administered: Reg solid; Dysphagia Pureed (Dysphagia I); Thin - cup    Current Diet level:  Current Diet : NPO  Current Liquid Diet : NPO    Oral Motor Deficits  Oral/Motor  Oral Motor: Within functional limits    Oral Phase Dysfunction  Oral Phase  Oral Phase: WFL     Indicators of Pharyngeal Phase Dysfunction   Pharyngeal Phase  Pharyngeal Phase: WFL    Impression  Dysphagia Diagnosis: Swallow function appears grossly intact  Dysphagia Impression : Pt presents with functional swallow at bedside this date characterized by adequate mastication, A-P transfer, with no observed pocketing and good oral clearance on trials of regular,puree, and thin. No overt s/s of aspiration on trials of thin, puree, or regular. Hyolaryngeal elevation present observed via palpation. Pt reports no pain with swallowing. Dysphagia Outcome Severity Scale: Level 7: Normal in all situations     Treatment Plan  Requires SLP Intervention: No  Duration/Frequency of Treatment: n/a  D/C Recommendations: Home independently       Prognosis  Individuals consulted  Consulted and agree with results and recommendations: Patient;RN    Education  Patient Education: results of BSE  Patient Education Response: Verbalizes understanding  Safety Devices in place: Yes       Pain Assessment:  Pre-Treatment  Pain assessment: 0-10  Pain level: 0  Intervention:  Patient denies pain. Post-Treatment  Pain assessment: 0-10  Pain level: 0  Intervention:  Patient denies pain.          Therapy Time  SLP Individual Minutes  Time In: 5306  Time Out: 2802  Minutes: 13              Signature: Electronically signed by RICHA Grayson on 1/12/2022 at 10:00 AM

## 2022-01-12 NOTE — PROGRESS NOTES
Progress Note  Date:2022       Room:W2/W264-01  Patient Melisa Jimenez     YOB: 1995     Age:26 y.o. Subjective    Subjective   No acute events overnight. Laboratory screening positive for group A strep. Review of Systems   Swelling significantly improved. Started on clear liquid diet by ENT. Pain with swallowing and talking decreasing. Objective         Vitals Last 24 Hours:  TEMPERATURE:  Temp  Av.6 °F (36.4 °C)  Min: 97.5 °F (36.4 °C)  Max: 97.9 °F (36.6 °C)  RESPIRATIONS RANGE: Resp  Avg: 15.5  Min: 14  Max: 16  PULSE OXIMETRY RANGE: SpO2  Av %  Min: 97 %  Max: 100 %  PULSE RANGE: Pulse  Av.3  Min: 70  Max: 96  BLOOD PRESSURE RANGE: Systolic (18HIP), YVV:853 , Min:124 , AGR:116   ; Diastolic (94IZS), ORB:08, Min:70, Max:79    I/O (24Hr): No intake or output data in the 24 hours ending 22 1413  Objective   Constitutional: Generally healthy appearing young adult male reclining in bed in no acute distress. Head: NCAT  Eyes: PERRLA, EOMI  ENT: Hearing grossly intact. L >R bilateral significant tonsil swelling with associated posterior oropharyngeal erythema, improved compared to prior day. Neck: Decreased left-sided soft tissue swelling near superior submandibular region. Lymphadenopathy present. Phonation normal.  Cardiovascular: RRR, no significant murmurs appreciated. Warm and well perfused peripherally. No pretibial edema. Pulmonary: Normal rate and effort of respiration on room air. CTAB. No stridor or wheezing. Abdomen: Flat, soft, nontense abdomen. Normoactive bowel sounds appreciated. Nontender to palpation. Neurologic: Grossly oriented. No focal neurologic deficits appreciated. Psychiatric: Pleasant, calm, cooperative.         Labs/Imaging/Diagnostics    Labs:  CBC:Recent Labs     22  1230   WBC 11.6*   RBC 4.84   HGB 14.2   HCT 42.8   MCV 88.4   RDW 13.3        CHEMISTRIES:  Recent Labs     22  1230 01/11/22  1327     --    K 4.1  --      --    CO2 24  --    BUN 9  --    CREATININE 0.88 0.9   GLUCOSE 109*  --      PT/INR:No results for input(s): PROTIME, INR in the last 72 hours. APTT:No results for input(s): APTT in the last 72 hours. LIVER PROFILE:  Recent Labs     01/11/22  1230   AST 11   ALT 7   BILITOT 0.5   ALKPHOS 89       Imaging Last 24 Hours:  CT SOFT TISSUE NECK W CONTRAST    Result Date: 1/11/2022  CT SOFT TISSUE NECK WITH INTRAVENOUS CONTRAST MEDIUM. HISTORY: LEFT NECK SWELLING. TROUBLE SWALLOWING. TECHNICAL FACTORS: CT soft tissue neck obtained and formatted as 2.5 mm contiguous axial images. Sagittal and coronal reconstruction obtained during postprocessing. INTRAVENOUS CONTRAST MEDIUM:  Isovue-300, 100 mL COMPARISON: None FINDINGS: Skull base:Normal Orbits: . Without anomaly. Facial sinuses:  Partial opacification bilateral ethmoid sinuses. Opacification with calcification, right sphenoid sinus. Mucosal thickening with rounded soft tissue attenuation left maxillary sinus. Nasopharynx: Normal Mastoids:  Air cells well aerated. Oral cavity: Without anomaly. Oropharynx: Bilateral tonsillar enlargement with ill-defined areas decreased attenuation identified bilaterally. Bilateral parapharyngeal spaces and retropharyngeal spaces: Preserved. Salivary glands: Normal Hypopharynx, larynx, supraglottis, infraglottis: : Without anomaly. Lymph nodes: Multiple bilateral lymph nodes identified in anterior posterior cervical triangle, jugulodigastric spaces, and submandibular spaces. Lymph nodes measure up to 1 cm in size. Vascular: Vascular structures enhance uniformly. Thyroid: Normal. Lung apices: Without anomaly. Bilateral tonsillitis with phlegmonous change. Bilateral diffuse lymph notes as described, with several showing borderline lymph node enlargement. Pansinusitis.  All CT scans at this facility use dose modulation, iterative reconstruction, and/or weight based dosing when appropriate to reduce radiation dose to as low as reasonably achievable. Assessment//Plan           Hospital Problems           Last Modified POA    Tonsillar abscess 1/11/2022 Yes        Assessment & Plan    Active Problems:  · Severe tonsilitis (L>R) with associated LAD and concern for airway protection, with positive Group A strep test  · Pansinusitis  · Cervical LAD    Plan:  -Admit inpatient status  -ENT contacted by ED, will follow  -Continue with Zosyn initiated in ED  -Solu-Medrol 40 mg IV daily  -Follow-up blood cultures obtained in ED  -SLP consult  -NPO  -Lovenox for DVT prophylaxis  1/12: ENT following, plans for ABX. Group A strep screen positive. Transition from Zosyn to ceftriaxone 1 g IV daily. Plan for discharge on Augmentin when medically cleared. Decrease Solu-Medrol from 40 to 20 mg IV daily. Plan for likely discharge next day (1/13) pending continued stability/improvement.         Electronically signed by [facility], DO on 1/12/22 at 2:13 PM EST

## 2022-01-12 NOTE — CONSULTS
Department of Otolaryngology  Attending Consult Note      Reason for Consult: Evaluation for peritonsillar cellulitis and possible abscess  Requesting Physician: Dr. Duenas Sees: Sore throat and difficulty swallowing of several days duration    History Obtained From:  patient    HISTORY OF PRESENT ILLNESS:                The patient is a 23 y.o. male with significant past medical history of difficulty swallowing and sore throat of several days duration who presents with increasing difficulty with swallowing to the point he can only take small amount of liquids. Past Medical History:    History reviewed. No pertinent past medical history. Past Surgical History:    History reviewed. No pertinent surgical history. Current Medications:   Current Facility-Administered Medications: sodium chloride flush 0.9 % injection 5-40 mL, 5-40 mL, IntraVENous, 2 times per day  sodium chloride flush 0.9 % injection 5-40 mL, 5-40 mL, IntraVENous, PRN  0.9 % sodium chloride infusion, 25 mL, IntraVENous, PRN  ondansetron (ZOFRAN-ODT) disintegrating tablet 4 mg, 4 mg, Oral, Q8H PRN **OR** ondansetron (ZOFRAN) injection 4 mg, 4 mg, IntraVENous, Q6H PRN  enoxaparin (LOVENOX) injection 40 mg, 40 mg, SubCUTAneous, Daily  piperacillin-tazobactam (ZOSYN) 3,375 mg in dextrose 5 % 50 mL IVPB (mini-bag), 3,375 mg, IntraVENous, Q8H  methylPREDNISolone sodium (SOLU-MEDROL) injection 40 mg, 40 mg, IntraVENous, Daily  Allergies:  Patient has no known allergies. Social History:    TOBACCO:   reports that he has never smoked. He has never used smokeless tobacco.  ETOH:   reports current alcohol use. DRUGS:   reports current drug use. Drug: Marijuana Rody Shields. SEXUAL HISTORY:  @[facility]@  ACTIVITIES OF DAILY LIVING:  @[facility]@  MARITAL STATUS:  @[facility]@  OCCUPATION:  @[facility]@  LEVEL OF EDUCATION:  @[facility]@  Family History:   History reviewed. No pertinent family history.   REVIEW OF SYSTEMS:    HEENT:  negative except for  sore throat  RESPIRATORY:  negative except for  dry cough  GASTROINTESTINAL:  negative except for nausea    PHYSICAL EXAM:    VITALS:  /79   Pulse 70   Temp 97.5 °F (36.4 °C)   Resp 16   Ht 5' 8\" (1.727 m)   Wt 175 lb (79.4 kg)   SpO2 100%   BMI 26.61 kg/m²   24HR INTAKE/OUTPUT:  No intake or output data in the 24 hours ending 01/12/22 1334  URINARY CATHETER OUTPUT ([place]):     DRAIN/TUBE OUTPUT:     VENT SETTINGS:  Vent Information  SpO2: 100 %  Additional Respiratory  Assessments  Pulse: 70  Resp: 16  SpO2: 100 %  CONSTITUTIONAL:  awake, alert, cooperative, no apparent distress, and appears stated age  ENT:  Normocephalic, without obvious abnormality, atraumatic, sinuses nontender on palpation, external ears without lesions, oral pharynx with moist mucus membranes, tonsils  erythema or exudates and asymmetric enlargement of the left tonsil, gums normal and good dentition. NECK:  Supple, symmetrical, trachea midline, no adenopathy, thyroid symmetric, lymph nodes enlarged and no tenderness, skin normal    DATA:    Recent Labs     01/11/22  1230   WBC 11.6*   HGB 14.2   HCT 42.8        Recent Labs     01/11/22  1230 01/11/22  1327     --    K 4.1  --      --    CO2 24  --    BUN 9  --    CREATININE 0.88 0.9   CALCIUM 9.2  --      Recent Labs     01/11/22  1230   AST 11   ALT 7   BILITOT 0.5   ALKPHOS 89     No results for input(s): INR in the last 72 hours. No results for input(s): Blair Amber in the last 72 hours. CT SOFT TISSUE NECK W CONTRAST    Result Date: 1/11/2022  CT SOFT TISSUE NECK WITH INTRAVENOUS CONTRAST MEDIUM. HISTORY: LEFT NECK SWELLING. TROUBLE SWALLOWING. TECHNICAL FACTORS: CT soft tissue neck obtained and formatted as 2.5 mm contiguous axial images. Sagittal and coronal reconstruction obtained during postprocessing. INTRAVENOUS CONTRAST MEDIUM:  Isovue-300, 100 mL COMPARISON: None FINDINGS: Skull base:Normal Orbits: . Without anomaly.  Facial sinuses:  Partial opacification bilateral ethmoid sinuses. Opacification with calcification, right sphenoid sinus. Mucosal thickening with rounded soft tissue attenuation left maxillary sinus. Nasopharynx: Normal Mastoids:  Air cells well aerated. Oral cavity: Without anomaly. Oropharynx: Bilateral tonsillar enlargement with ill-defined areas decreased attenuation identified bilaterally. Bilateral parapharyngeal spaces and retropharyngeal spaces: Preserved. Salivary glands: Normal Hypopharynx, larynx, supraglottis, infraglottis: : Without anomaly. Lymph nodes: Multiple bilateral lymph nodes identified in anterior posterior cervical triangle, jugulodigastric spaces, and submandibular spaces. Lymph nodes measure up to 1 cm in size. Vascular: Vascular structures enhance uniformly. Thyroid: Normal. Lung apices: Without anomaly. Bilateral tonsillitis with phlegmonous change. Bilateral diffuse lymph notes as described, with several showing borderline lymph node enlargement. Pansinusitis. All CT scans at this facility use dose modulation, iterative reconstruction, and/or weight based dosing when appropriate to reduce radiation dose to as low as reasonably achievable. IMPRESSION/RECOMMENDATIONS:      Acute severe tonsillitis    Peritonsillar cellulitis and possible developing abscess. Leukocytosis. Sinusitis    Plan   I will follow the patient while in the hospital.  He can be started on oral intake for today to progress to as tolerated. He will continue the antibiotics. Once discharged from the hospital I will follow him in the office.

## 2022-01-12 NOTE — CARE COORDINATION
UPDATE: PATIENT DOES HAVE HEALTH INSURANCE VERIFIED UNDER HIS MOTHER, PATIENT CAN NOW SCHEDULE WITH ANY PCP OF HIS CHOICE PER HIS CURRENT INSURANCE PLAN. DISCUSSED THIS WITH THE PATIENT AND A PCP LIST PROVIDED TO THE PATIENT. HE IS GOING TO DISCUSS HIS INSURANCE COVERAGE THAT HE WAS UNAWARE OF WITH HIS MOM ALONG WITH WHO HE SHOULD SEE FOR A PCP UNDER HER INSURANCE PLAN. PATIENT WILL UPDATE CM OR NURSE. [facility] [place] Case Management Initial Discharge Assessment    Met with Patient to discuss discharge plan. PCP: needs an appointment with [facility]. No insurance, patient aware    If no PCP, list provided? Yes    Discharge Planning    Living Arrangements: independently at home    Who do you live with? DAD    Who helps you with your care:  self or family    If lives at home:     Do you have any barriers navigating in your home? no    Patient can perform ADL? Yes    Current Services (outpatient and in home) :  None    Dialysis: No    Is transportation available to get to your appointments? Yes    DME Equipment:  no    Respiratory equipment: None    Respiratory provider:  no     Pharmacy:  yes - [facility] [place] [address] [place]    Consult with Medication Assistance Program?  Yes      Patient agreeable to [address]? No and Declined    Patient agreeable to SNF/Rehab? No and Declined    Other discharge needs identified? HELP program, NEEDS PCP    Does Patient Have a High-Risk for Readmission Diagnosis (CHF, PN, MI, COPD)? No    Initial Discharge Plan? (Note: please see concurrent daily documentation for any updates after initial note). RETURN HOME W/ DAD. DENIES NEEDS. MONITOR FOR ANY IV NEEDS AT D/C, MONITOR FOR MEDICATION COST.  PATIENT NEEDS CHEAP MEDS AT D/C NO INSURANCE    Readmission Risk              Risk of Unplanned Readmission:  6         Electronically signed by Ama Walsh RN on 1/12/2022 at 9:30 AM

## 2022-01-13 VITALS
HEART RATE: 68 BPM | TEMPERATURE: 98.4 F | DIASTOLIC BLOOD PRESSURE: 84 MMHG | OXYGEN SATURATION: 100 % | BODY MASS INDEX: 26.52 KG/M2 | RESPIRATION RATE: 18 BRPM | HEIGHT: 68 IN | WEIGHT: 175 LBS | SYSTOLIC BLOOD PRESSURE: 126 MMHG

## 2022-01-13 LAB
ANION GAP SERPL CALCULATED.3IONS-SCNC: 13 MEQ/L (ref 9–15)
BASOPHILS ABSOLUTE: 0 K/UL (ref 0–0.2)
BASOPHILS RELATIVE PERCENT: 0.3 %
BUN BLDV-MCNC: 14 MG/DL (ref 6–20)
CALCIUM SERPL-MCNC: 9.4 MG/DL (ref 8.5–9.9)
CHLORIDE BLD-SCNC: 102 MEQ/L (ref 95–107)
CO2: 24 MEQ/L (ref 20–31)
CREAT SERPL-MCNC: 0.84 MG/DL (ref 0.7–1.2)
EKG ATRIAL RATE: 66 BPM
EKG P AXIS: 50 DEGREES
EKG P-R INTERVAL: 148 MS
EKG Q-T INTERVAL: 428 MS
EKG QRS DURATION: 84 MS
EKG QTC CALCULATION (BAZETT): 448 MS
EKG R AXIS: 83 DEGREES
EKG T AXIS: 70 DEGREES
EKG VENTRICULAR RATE: 66 BPM
EOSINOPHILS ABSOLUTE: 0 K/UL (ref 0–0.7)
EOSINOPHILS RELATIVE PERCENT: 0.3 %
GFR AFRICAN AMERICAN: >60
GFR NON-AFRICAN AMERICAN: >60
GLUCOSE BLD-MCNC: 94 MG/DL (ref 70–99)
HCT VFR BLD CALC: 45.6 % (ref 42–52)
HEMOGLOBIN: 14.9 G/DL (ref 14–18)
LYMPHOCYTES ABSOLUTE: 2.5 K/UL (ref 1–4.8)
LYMPHOCYTES RELATIVE PERCENT: 25.1 %
MCH RBC QN AUTO: 29.2 PG (ref 27–31.3)
MCHC RBC AUTO-ENTMCNC: 32.8 % (ref 33–37)
MCV RBC AUTO: 89 FL (ref 80–100)
MONOCYTES ABSOLUTE: 0.8 K/UL (ref 0.2–0.8)
MONOCYTES RELATIVE PERCENT: 8.7 %
NEUTROPHILS ABSOLUTE: 6.4 K/UL (ref 1.4–6.5)
NEUTROPHILS RELATIVE PERCENT: 65.6 %
PDW BLD-RTO: 13.5 % (ref 11.5–14.5)
PLATELET # BLD: 312 K/UL (ref 130–400)
POTASSIUM REFLEX MAGNESIUM: 3.8 MEQ/L (ref 3.4–4.9)
RBC # BLD: 5.12 M/UL (ref 4.7–6.1)
SODIUM BLD-SCNC: 139 MEQ/L (ref 135–144)
WBC # BLD: 9.8 K/UL (ref 4.8–10.8)

## 2022-01-13 PROCEDURE — 80048 BASIC METABOLIC PNL TOTAL CA: CPT

## 2022-01-13 PROCEDURE — 2580000003 HC RX 258: Performed by: INTERNAL MEDICINE

## 2022-01-13 PROCEDURE — 36415 COLL VENOUS BLD VENIPUNCTURE: CPT

## 2022-01-13 PROCEDURE — 6360000002 HC RX W HCPCS: Performed by: INTERNAL MEDICINE

## 2022-01-13 PROCEDURE — 85025 COMPLETE CBC W/AUTO DIFF WBC: CPT

## 2022-01-13 RX ORDER — PREDNISONE 1 MG/1
TABLET ORAL
Qty: 3 TABLET | Refills: 0 | Status: SHIPPED | OUTPATIENT
Start: 2022-01-14 | End: 2022-01-16

## 2022-01-13 RX ORDER — AMOXICILLIN AND CLAVULANATE POTASSIUM 875; 125 MG/1; MG/1
1 TABLET, FILM COATED ORAL 2 TIMES DAILY
Qty: 14 TABLET | Refills: 0 | Status: SHIPPED | OUTPATIENT
Start: 2022-01-13 | End: 2022-01-20

## 2022-01-13 RX ADMIN — Medication 10 ML: at 09:30

## 2022-01-13 RX ADMIN — METHYLPREDNISOLONE SODIUM SUCCINATE 20 MG: 40 INJECTION, POWDER, FOR SOLUTION INTRAMUSCULAR; INTRAVENOUS at 09:29

## 2022-01-13 NOTE — DISCHARGE SUMMARY
Discharge Summary    Date: 1/13/2022  Patient Name: Faye Downey YOB: 1995 Age: 23 y. o. Admit Date: 1/11/2022  Discharge Date: 1/13/2022  Discharge Condition: Good    Admission Diagnosis  Phlegmon (L02.91); Tonsillar abscess (J36); Acute tonsillitis, unspecified etiology (J03.90)     Discharge Diagnosis  Active Problems: Tonsillar abscessResolved Problems: * No resolved hospital problems. [facility] Stay  Narrative of Hospital Course:  Patient is a 27-year-old male admitted 1/11/2022 with L >R throat swelling, found to have severe tonsillitis with positive group A strep screen. Treated with antibiotics and steroids with very good response. Deemed sufficiently improved and appropriate for discharge as of 1/13/2022. Will need follow-up in 1 week time with ENT. Will need outpatient follow-up with PCP. Consultants:  IP CONSULT TO OTOLARYNGOLOGY    Surgeries/procedures Performed:       Treatments:   Antibiotics and Steroids        Discharge Plan/Disposition:  Home    Hospital/Incidental Findings Requiring Follow Up:    Patient Instructions:    Diet:    Activity:  For number of days (if applicable): Other Instructions:    Provider Follow-Up:   No follow-ups on file.      Significant Diagnostic Studies:    Recent Labs:  Admission on 01/11/2022Sodium                                        Date: 01/11/2022Value: 139         Ref range: 135 - 144 mEq/L    Status: FinalPotassium                                     Date: 01/11/2022Value: 4.1         Ref range: 3.4 - 4.9 mEq/L    Status: FinalChloride                                      Date: 01/11/2022Value: 102         Ref range: 95 - 107 mEq/L     Status: FinalCO2                                           Date: 01/11/2022Value: 24          Ref range: 20 - 31 mEq/L      Status: FinalAnion Gap                                     Date: 01/11/2022Value: 13          Ref range: 9 - 15 mEq/L       Status: FinalGlucose Date: 01/11/2022Value: 109*        Ref range: 70 - 99 mg/dL      Status: FinalBUN                                           Date: 01/11/2022Value: 9           Ref range: 6 - 20 mg/dL       Status: FinalCREATININE                                    Date: 01/11/2022Value: 0.88        Ref range: 0.70 - 1.20 mg/dL  Status: FinalGFR Non-                      Date: 01/11/2022Value: >60.0       Ref range: >60                Status: Final              Comment: >60 mL/min/1.73m2 EGFR, calc. for ages 25 and older using theMDRD formula (not corrected for weight), is valid for stablerenal function. GFR                           Date: 01/11/2022Value: >60.0       Ref range: >60                Status: Final              Comment: >60 mL/min/1.73m2 EGFR, calc. for ages 25 and older using theMDRD formula (not corrected for weight), is valid for stablerenal function. Calcium                                       Date: 01/11/2022Value: 9.2         Ref range: 8.5 - 9.9 mg/dL    Status: FinalTotal Protein                                 Date: 01/11/2022Value: 7.6         Ref range: 6.3 - 8.0 g/dL     Status: FinalAlbumin                                       Date: 01/11/2022Value: 4.1         Ref range: 3.5 - 4.6 g/dL     Status: FinalTotal Bilirubin                               Date: 01/11/2022Value: 0.5         Ref range: 0.2 - 0.7 mg/dL    Status: FinalAlkaline Phosphatase                          Date: 01/11/2022Value: 89          Ref range: 35 - 104 U/L       Status: FinalALT                                           Date: 01/11/2022Value: 7           Ref range: 0 - 41 U/L         Status: FinalAST                                           Date: 01/11/2022Value: 11          Ref range: 0 - 40 U/L         Status: FinalGlobulin                                      Date: 01/11/2022Value: 3.5         Ref range: 2.3 - 3.5 g/dL     Status: FinalBlood Culture, Routine                        Date: 01/11/2022Value: No Growth to date. Any change in status will be *                     Status: PreliminaryCulture, Blood 2                              Date: 01/11/2022Value: No Growth to date.   Any change in status will be *                     Status: PreliminaryWBC                                           Date: 01/11/2022Value: 11.6*       Ref range: 4.8 - 10.8 K/uL    Status: FinalRBC                                           Date: 01/11/2022Value: 4.84        Ref range: 4.70 - 6.10 M/uL   Status: FinalHemoglobin                                    Date: 01/11/2022Value: 14.2        Ref range: 14.0 - 18.0 g/dL   Status: FinalHematocrit                                    Date: 01/11/2022Value: 42.8        Ref range: 42.0 - 52.0 %      Status: FinalMCV                                           Date: 01/11/2022Value: 88.4        Ref range: 80.0 - 100.0 fL    Status: [address]                                           Date: 01/11/2022Value: 29.3        Ref range: 27.0 - 31.3 pg     Status: [address]                                          Date: 01/11/2022Value: 33.1        Ref range: 33.0 - 37.0 %      Status: FinalRDW                                           Date: 01/11/2022Value: 13.3        Ref range: 11.5 - 14.5 %      Status: FinalPlatelets                                     Date: 01/11/2022Value: 286         Ref range: 130 - 400 K/uL     Status: FinalNeutrophils %                                 Date: 01/11/2022Value: 81.4        Ref range: %                  Status: FinalLymphocytes %                                 Date: 01/11/2022Value: 9.6         Ref range: %                  Status: FinalMonocytes %                                   Date: 01/11/2022Value: 8.6         Ref range: %                  Status: FinalEosinophils %                                 Date: 01/11/2022Value: 0.2         Ref range: %                  Status: FinalBasophils %                                   Date: 01/11/2022Value: 0.2         Ref range: %                  Status: FinalNeutrophils Absolute                          Date: 01/11/2022Value: 9.4*        Ref range: 1.4 - 6.5 K/uL     Status: FinalLymphocytes Absolute                          Date: 01/11/2022Value: 1.1         Ref range: 1.0 - 4.8 K/uL     Status: FinalMonocytes Absolute                            Date: 01/11/2022Value: 1.0*        Ref range: 0.2 - 0.8 K/uL     Status: FinalEosinophils Absolute                          Date: 01/11/2022Value: 0.0         Ref range: 0.0 - 0.7 K/uL     Status: FinalBasophils Absolute                            Date: 01/11/2022Value: 0.0         Ref range: 0.0 - 0.2 K/uL     Status: FinalLactic Acid                                   Date: 01/11/2022Value: 1.6         Ref range: 0.5 - 2.2 mmol/L   Status: FinalPOC CREATININE WHOLE BLOOD                    Date: 01/11/2022Value: 0.9           Status: FinalMono Test                                     Date: 01/11/2022Value: Negative      Status: FinalStrep Grp A PCR                               Date: 01/11/2022Value: POSITIVE*     Status: Final              Comment: Positive for Strep A nucleic acid. WBC                                           Date: 01/12/2022Value: 10.3        Ref range: 4.8 - 10.8 K/uL    Status: FinalRBC                                           Date: 01/12/2022Value: 5.25        Ref range: 4.70 - 6.10 M/uL   Status: FinalHemoglobin                                    Date: 01/12/2022Value: 15.2        Ref range: 14.0 - 18.0 g/dL   Status: FinalHematocrit                                    Date: 01/12/2022Value: 46.1        Ref range: 42.0 - 52.0 %      Status: FinalMCV                                           Date: 01/12/2022Value: 87.8        Ref range: 80.0 - 100.0 fL    Status: [address]                                           Date: 01/12/2022Value: 29.0        Ref range: 27.0 - 31.3 pg     Status: [address]                                          Date: 01/12/2022Value: 33.0        Ref range: 33.0 - 37.0 %      Status: FinalRDW                                           Date: 01/12/2022Value: 13.4        Ref range: 11.5 - 14.5 %      Status: FinalPlatelets                                     Date: 01/12/2022Value: 346         Ref range: 130 - 400 K/uL     Status: FinalNeutrophils %                                 Date: 01/12/2022Value: 91.1        Ref range: %                  Status: FinalLymphocytes %                                 Date: 01/12/2022Value: 6.7         Ref range: %                  Status: FinalMonocytes %                                   Date: 01/12/2022Value: 2.1         Ref range: %                  Status: FinalEosinophils %                                 Date: 01/12/2022Value: 0.0         Ref range: %                  Status: FinalBasophils %                                   Date: 01/12/2022Value: 0.1         Ref range: %                  Status: FinalNeutrophils Absolute                          Date: 01/12/2022Value: 9.3*        Ref range: 1.4 - 6.5 K/uL     Status: FinalLymphocytes Absolute                          Date: 01/12/2022Value: 0.7*        Ref range: 1.0 - 4.8 K/uL     Status: FinalMonocytes Absolute                            Date: 01/12/2022Value: 0.2         Ref range: 0.2 - 0.8 K/uL     Status: FinalEosinophils Absolute                          Date: 01/12/2022Value: 0.0         Ref range: 0.0 - 0.7 K/uL     Status: FinalBasophils Absolute                            Date: 01/12/2022Value: 0.0         Ref range: 0.0 - 0.2 K/uL     Status: FinalSodium                                        Date: 01/12/2022Value: 139         Ref range: 135 - 144 mEq/L    Status: FinalPotassium reflex Magnesium                    Date: 01/12/2022Value: 4.7         Ref range: 3.4 - 4.9 mEq/L    Status: FinalChloride                                      Date: 01/12/2022Value: 102         Ref range: 95 - 107 mEq/L     Status: [facility] Date: 01/12/2022Value: 21          Ref range: 20 - 31 mEq/L      Status: FinalAnion Gap                                     Date: 01/12/2022Value: 16*         Ref range: 9 - 15 mEq/L       Status: FinalGlucose                                       Date: 01/12/2022Value: 137*        Ref range: 70 - 99 mg/dL      Status: FinalBUN                                           Date: 01/12/2022Value: 15          Ref range: 6 - 20 mg/dL       Status: FinalCREATININE                                    Date: 01/12/2022Value: 0.90        Ref range: 0.70 - 1.20 mg/dL  Status: FinalGFR Non-                      Date: 01/12/2022Value: >60.0       Ref range: >60                Status: Final              Comment: >60 mL/min/1.73m2 EGFR, calc. for ages 25 and older using theMDRD formula (not corrected for weight), is valid for stablerenal function. GFR                           Date: 01/12/2022Value: >60.0       Ref range: >60                Status: Final              Comment: >60 mL/min/1.73m2 EGFR, calc. for ages 25 and older using theMDRD formula (not corrected for weight), is valid for stablerenal function. Calcium                                       Date: 01/12/2022Value: 9.8         Ref range: 8.5 - 9.9 mg/dL    Status: FinalPOC Creatinine                                Date: 01/11/2022Value: 0.9         Ref range: 0.9 - 1.3 mg/dL    Status: FinalGFR Non-                      Date: 01/11/2022Value: >60         Ref range: >60                Status: Final              Comment: >60 mL/min/1.73m2 EGFR, calc. for ages 25 and older using theMDRD formula (not corrected for weight), is valid for stablerenal function. GFR                           Date: 01/11/2022Value: >60         Ref range: >60                Status: Final              Comment: >60 mL/min/1.73m2 EGFR, calc. for ages 25 and older using theMDRD formula (not corrected for weight), is valid for stablerenal function. Sample Type                                   Date: 01/11/2022Value: ITZEL           Status: FinalPerformed on                                  Date: 01/11/2022Value: SEE BELOW     Status: Final              Comment: Performed on [address]                                           Date: 01/13/2022Value: 9.8         Ref range: 4.8 - 10.8 K/uL    Status: FinalRBC                                           Date: 01/13/2022Value: 5.12        Ref range: 4.70 - 6.10 M/uL   Status: FinalHemoglobin                                    Date: 01/13/2022Value: 14.9        Ref range: 14.0 - 18.0 g/dL   Status: FinalHematocrit                                    Date: 01/13/2022Value: 45.6        Ref range: 42.0 - 52.0 %      Status: FinalMCV                                           Date: 01/13/2022Value: 89.0        Ref range: 80.0 - 100.0 fL    Status: [address]                                           Date: 01/13/2022Value: 29.2        Ref range: 27.0 - 31.3 pg     Status: [address]                                          Date: 01/13/2022Value: 32.8*       Ref range: 33.0 - 37.0 %      Status: FinalRDW                                           Date: 01/13/2022Value: 13.5        Ref range: 11.5 - 14.5 %      Status: FinalPlatelets                                     Date: 01/13/2022Value: 312         Ref range: 130 - 400 K/uL     Status: FinalNeutrophils %                                 Date: 01/13/2022Value: 65.6        Ref range: %                  Status: FinalLymphocytes %                                 Date: 01/13/2022Value: 25.1        Ref range: %                  Status: FinalMonocytes %                                   Date: 01/13/2022Value: 8.7         Ref range: %                  Status: FinalEosinophils %                                 Date: 01/13/2022Value: 0.3         Ref range: %                  Status: FinalBasophils %                                   Date: 01/13/2022Value: 0.3 Ref range: %                  Status: FinalNeutrophils Absolute                          Date: 01/13/2022Value: 6.4         Ref range: 1.4 - 6.5 K/uL     Status: FinalLymphocytes Absolute                          Date: 01/13/2022Value: 2.5         Ref range: 1.0 - 4.8 K/uL     Status: FinalMonocytes Absolute                            Date: 01/13/2022Value: 0.8         Ref range: 0.2 - 0.8 K/uL     Status: FinalEosinophils Absolute                          Date: 01/13/2022Value: 0.0         Ref range: 0.0 - 0.7 K/uL     Status: FinalBasophils Absolute                            Date: 01/13/2022Value: 0.0         Ref range: 0.0 - 0.2 K/uL     Status: FinalSodium                                        Date: 01/13/2022Value: 139         Ref range: 135 - 144 mEq/L    Status: FinalPotassium reflex Magnesium                    Date: 01/13/2022Value: 3.8         Ref range: 3.4 - 4.9 mEq/L    Status: FinalChloride                                      Date: 01/13/2022Value: 102         Ref range: 95 - 107 mEq/L     Status: FinalCO2                                           Date: 01/13/2022Value: 24          Ref range: 20 - 31 mEq/L      Status: FinalAnion Gap                                     Date: 01/13/2022Value: 13          Ref range: 9 - 15 mEq/L       Status: FinalGlucose                                       Date: 01/13/2022Value: 94          Ref range: 70 - 99 mg/dL      Status: FinalBUN                                           Date: 01/13/2022Value: 14          Ref range: 6 - 20 mg/dL       Status: FinalCREATININE                                    Date: 01/13/2022Value: 0.84        Ref range: 0.70 - 1.20 mg/dL  Status: FinalGFR Non-                      Date: 01/13/2022Value: >60.0       Ref range: >60                Status: Final              Comment: >60 mL/min/1.73m2 EGFR, calc. for ages 25 and older using theMDRD formula (not corrected for weight), is valid for stablerenal function. GFR                           Date: 01/13/2022Value: >60.0       Ref range: >60                Status: Final              Comment: >60 mL/min/1.73m2 EGFR, calc. for ages 25 and older using theMDRD formula (not corrected for weight), is valid for stablerenal function. Calcium                                       Date: 01/13/2022Value: 9.4         Ref range: 8.5 - 9.9 mg/dL    Status: FinalVentricular Rate                              Date: 01/11/2022Value: 66          Ref range: BPM                Status: PreliminaryAtrial Rate                                   Date: 01/11/2022Value: 66          Ref range: BPM                Status: PreliminaryP-R Interval                                  Date: 01/11/2022Value: 148         Ref range: ms                 Status: PreliminaryQRS Duration                                  Date: 01/11/2022Value: 84          Ref range: ms                 Status: PreliminaryQ-T Interval                                  Date: 01/11/2022Value: 428         Ref range: ms                 Status: PreliminaryQTc Calculation (Bazett)                      Date: 01/11/2022Value: 448         Ref range: ms                 Status: PreliminaryP Axis                                        Date: 01/11/2022Value: 50          Ref range: degrees            Status: PreliminaryR Axis                                        Date: 01/11/2022Value: 83          Ref range: degrees            Status: PreliminaryT Axis                                        Date: 01/11/2022Value: 70          Ref range: degrees            Status: Preliminary------------    Radiology last 7 days:  CT SOFT TISSUE NECK W CONTRASTResult Date: 1/11/2022Bilateral tonsillitis with phlegmonous change. Bilateral diffuse lymph notes as described, with several showing borderline lymph node enlargement. Pansinusitis.  All CT scans at this facility use dose modulation, iterative reconstruction, and/or weight based dosing when appropriate to reduce radiation dose to as low as reasonably achievable. Pending Labs   Order Current Status  Culture, Blood 1 Preliminary result  Culture, Blood 2 Preliminary result      Discharge Medications    Current Discharge Medication ListSTART taking these medicationsamoxicillin-clavulanate (AUGMENTIN) 875-125 MG per tabletTake 1 tablet by mouth 2 times daily for 7 daysQty: 14 tablet Refills: 0predniSONE (DELTASONE) 5 MG tabletTake 2 tablets by mouth daily for 1 day, THEN 1 tablet daily for 1 day. Qty: 3 tablet Refills: 0    Current Discharge Medication List    Current Discharge Medication List    Current Discharge Medication List    Time Spent on Discharge:3E] minutes were spent in patient examination, evaluation, counseling as well as medication reconciliation, prescriptions for required medications, discharge plan, and follow up.     Electronically signed by Shanti Peterson DO on 1/13/22 at 9:53 AM EST

## 2022-01-16 LAB
BLOOD CULTURE, ROUTINE: NORMAL
CULTURE, BLOOD 2: NORMAL

## 2024-12-11 ENCOUNTER — APPOINTMENT (OUTPATIENT)
Dept: CT IMAGING | Age: 29
End: 2024-12-11
Payer: MEDICAID

## 2024-12-11 ENCOUNTER — HOSPITAL ENCOUNTER (EMERGENCY)
Age: 29
Discharge: HOME | End: 2024-12-11
Payer: MEDICAID

## 2024-12-11 VITALS
SYSTOLIC BLOOD PRESSURE: 119 MMHG | TEMPERATURE: 98.2 F | BODY MASS INDEX: 26.52 KG/M2 | HEIGHT: 68 IN | RESPIRATION RATE: 18 BRPM | OXYGEN SATURATION: 97 % | WEIGHT: 175 LBS | HEART RATE: 107 BPM | DIASTOLIC BLOOD PRESSURE: 76 MMHG

## 2024-12-11 DIAGNOSIS — R19.7 NAUSEA VOMITING AND DIARRHEA: Primary | ICD-10-CM

## 2024-12-11 DIAGNOSIS — R11.2 NAUSEA VOMITING AND DIARRHEA: Primary | ICD-10-CM

## 2024-12-11 LAB
ALBUMIN SERPL-MCNC: 5.2 G/DL (ref 3.5–4.6)
ALP SERPL-CCNC: 92 U/L (ref 35–104)
ALT SERPL-CCNC: 32 U/L (ref 0–41)
ANION GAP SERPL CALCULATED.3IONS-SCNC: 13 MEQ/L (ref 9–15)
AST SERPL-CCNC: 22 U/L (ref 0–40)
BASOPHILS # BLD: 0 K/UL (ref 0–0.2)
BASOPHILS NFR BLD: 0.1 %
BILIRUB SERPL-MCNC: 0.5 MG/DL (ref 0.2–0.7)
BUN SERPL-MCNC: 16 MG/DL (ref 6–20)
CALCIUM SERPL-MCNC: 10.2 MG/DL (ref 8.5–9.9)
CHLORIDE SERPL-SCNC: 102 MEQ/L (ref 95–107)
CO2 SERPL-SCNC: 24 MEQ/L (ref 20–31)
CREAT SERPL-MCNC: 0.95 MG/DL (ref 0.7–1.2)
EOSINOPHIL # BLD: 0 K/UL (ref 0–0.7)
EOSINOPHIL NFR BLD: 0 %
ERYTHROCYTE [DISTWIDTH] IN BLOOD BY AUTOMATED COUNT: 12.9 % (ref 11.5–14.5)
GLOBULIN SER CALC-MCNC: 3.5 G/DL (ref 2.3–3.5)
GLUCOSE SERPL-MCNC: 136 MG/DL (ref 70–99)
HCT VFR BLD AUTO: 52.5 % (ref 42–52)
HGB BLD-MCNC: 17.8 G/DL (ref 14–18)
INFLUENZA A BY PCR: NEGATIVE
INFLUENZA B BY PCR: NEGATIVE
LACTATE BLDV-SCNC: 2.3 MMOL/L (ref 0.5–2.2)
LIPASE SERPL-CCNC: 17 U/L (ref 12–95)
LYMPHOCYTES # BLD: 0.2 K/UL (ref 1–4.8)
LYMPHOCYTES NFR BLD: 1.4 %
MCH RBC QN AUTO: 30.1 PG (ref 27–31.3)
MCHC RBC AUTO-ENTMCNC: 33.9 % (ref 33–37)
MCV RBC AUTO: 88.7 FL (ref 79–92.2)
MONOCYTES # BLD: 0.7 K/UL (ref 0.2–0.8)
MONOCYTES NFR BLD: 4.7 %
NEUTROPHILS # BLD: 13.2 K/UL (ref 1.4–6.5)
NEUTS SEG NFR BLD: 93.4 %
PLATELET # BLD AUTO: 288 K/UL (ref 130–400)
POTASSIUM SERPL-SCNC: 4.1 MEQ/L (ref 3.4–4.9)
PROT SERPL-MCNC: 8.7 G/DL (ref 6.3–8)
RBC # BLD AUTO: 5.92 M/UL (ref 4.7–6.1)
SARS-COV-2 RDRP RESP QL NAA+PROBE: NOT DETECTED
SODIUM SERPL-SCNC: 139 MEQ/L (ref 135–144)
WBC # BLD AUTO: 14.1 K/UL (ref 4.8–10.8)

## 2024-12-11 PROCEDURE — 96374 THER/PROPH/DIAG INJ IV PUSH: CPT

## 2024-12-11 PROCEDURE — 74177 CT ABD & PELVIS W/CONTRAST: CPT

## 2024-12-11 PROCEDURE — 6360000002 HC RX W HCPCS: Performed by: PHYSICIAN ASSISTANT

## 2024-12-11 PROCEDURE — 96375 TX/PRO/DX INJ NEW DRUG ADDON: CPT

## 2024-12-11 PROCEDURE — 85025 COMPLETE CBC W/AUTO DIFF WBC: CPT

## 2024-12-11 PROCEDURE — 6360000004 HC RX CONTRAST MEDICATION: Performed by: PHYSICIAN ASSISTANT

## 2024-12-11 PROCEDURE — 87502 INFLUENZA DNA AMP PROBE: CPT

## 2024-12-11 PROCEDURE — 99285 EMERGENCY DEPT VISIT HI MDM: CPT

## 2024-12-11 PROCEDURE — 87635 SARS-COV-2 COVID-19 AMP PRB: CPT

## 2024-12-11 PROCEDURE — 83690 ASSAY OF LIPASE: CPT

## 2024-12-11 PROCEDURE — 80053 COMPREHEN METABOLIC PANEL: CPT

## 2024-12-11 PROCEDURE — 83605 ASSAY OF LACTIC ACID: CPT

## 2024-12-11 RX ORDER — KETOROLAC TROMETHAMINE 15 MG/ML
15 INJECTION, SOLUTION INTRAMUSCULAR; INTRAVENOUS ONCE
Status: COMPLETED | OUTPATIENT
Start: 2024-12-11 | End: 2024-12-11

## 2024-12-11 RX ORDER — ONDANSETRON 2 MG/ML
4 INJECTION INTRAMUSCULAR; INTRAVENOUS ONCE
Status: COMPLETED | OUTPATIENT
Start: 2024-12-11 | End: 2024-12-11

## 2024-12-11 RX ORDER — ONDANSETRON 4 MG/1
4 TABLET, ORALLY DISINTEGRATING ORAL 3 TIMES DAILY PRN
Qty: 21 TABLET | Refills: 0 | Status: SHIPPED | OUTPATIENT
Start: 2024-12-11

## 2024-12-11 RX ORDER — DICYCLOMINE HYDROCHLORIDE 10 MG/1
10 CAPSULE ORAL
Qty: 20 CAPSULE | Refills: 0 | Status: SHIPPED | OUTPATIENT
Start: 2024-12-11

## 2024-12-11 RX ORDER — IOPAMIDOL 755 MG/ML
75 INJECTION, SOLUTION INTRAVASCULAR
Status: COMPLETED | OUTPATIENT
Start: 2024-12-11 | End: 2024-12-11

## 2024-12-11 RX ADMIN — KETOROLAC TROMETHAMINE 15 MG: 15 INJECTION, SOLUTION INTRAMUSCULAR; INTRAVENOUS at 17:10

## 2024-12-11 RX ADMIN — ONDANSETRON 4 MG: 2 INJECTION, SOLUTION INTRAMUSCULAR; INTRAVENOUS at 17:09

## 2024-12-11 RX ADMIN — IOPAMIDOL 75 ML: 755 INJECTION, SOLUTION INTRAVENOUS at 17:55

## 2024-12-11 ASSESSMENT — PAIN DESCRIPTION - DESCRIPTORS: DESCRIPTORS: ACHING

## 2024-12-11 ASSESSMENT — ENCOUNTER SYMPTOMS
ABDOMINAL DISTENTION: 0
DIARRHEA: 1
NAUSEA: 1
SORE THROAT: 0
RHINORRHEA: 0
COLOR CHANGE: 0
EYE DISCHARGE: 0
SHORTNESS OF BREATH: 0
CONSTIPATION: 0
VOMITING: 1
ABDOMINAL PAIN: 1

## 2024-12-11 ASSESSMENT — PAIN SCALES - GENERAL
PAINLEVEL_OUTOF10: 7
PAINLEVEL_OUTOF10: 7

## 2024-12-11 ASSESSMENT — PAIN DESCRIPTION - PAIN TYPE: TYPE: ACUTE PAIN

## 2024-12-11 ASSESSMENT — PAIN - FUNCTIONAL ASSESSMENT: PAIN_FUNCTIONAL_ASSESSMENT: 0-10

## 2024-12-11 ASSESSMENT — LIFESTYLE VARIABLES
HOW MANY STANDARD DRINKS CONTAINING ALCOHOL DO YOU HAVE ON A TYPICAL DAY: PATIENT DOES NOT DRINK
HOW OFTEN DO YOU HAVE A DRINK CONTAINING ALCOHOL: NEVER

## 2024-12-11 ASSESSMENT — PAIN DESCRIPTION - LOCATION
LOCATION: ABDOMEN
LOCATION: ABDOMEN

## 2024-12-12 LAB
PERFORMED ON: NORMAL
POC CREATININE: 1 MG/DL (ref 0.8–1.3)
POC SAMPLE TYPE: NORMAL

## 2024-12-12 NOTE — CARE COORDINATION
Pt states that he doesn't have a primary care provider. He was given a list of the [facility] providers and states that he will call and make himself an appointment to follow up.

## 2024-12-12 NOTE — ED NOTES
D/C instructions given to patient no questions ask.Patient verbalized understanding and ambulated from ED without any complications.